# Patient Record
Sex: MALE | Race: OTHER | HISPANIC OR LATINO | URBAN - METROPOLITAN AREA
[De-identification: names, ages, dates, MRNs, and addresses within clinical notes are randomized per-mention and may not be internally consistent; named-entity substitution may affect disease eponyms.]

---

## 2021-01-27 ENCOUNTER — INPATIENT (INPATIENT)
Facility: HOSPITAL | Age: 51
LOS: 6 days | Discharge: HOME | DRG: 554 | End: 2021-02-03
Attending: STUDENT IN AN ORGANIZED HEALTH CARE EDUCATION/TRAINING PROGRAM | Admitting: STUDENT IN AN ORGANIZED HEALTH CARE EDUCATION/TRAINING PROGRAM
Payer: MEDICAID

## 2021-01-27 VITALS
SYSTOLIC BLOOD PRESSURE: 134 MMHG | OXYGEN SATURATION: 100 % | WEIGHT: 225.09 LBS | HEIGHT: 66 IN | TEMPERATURE: 98 F | DIASTOLIC BLOOD PRESSURE: 86 MMHG | HEART RATE: 78 BPM | RESPIRATION RATE: 18 BRPM

## 2021-01-27 PROCEDURE — 99285 EMERGENCY DEPT VISIT HI MDM: CPT

## 2021-01-27 RX ORDER — KETOROLAC TROMETHAMINE 30 MG/ML
15 SYRINGE (ML) INJECTION ONCE
Refills: 0 | Status: DISCONTINUED | OUTPATIENT
Start: 2021-01-27 | End: 2021-01-27

## 2021-01-27 RX ORDER — OXYCODONE AND ACETAMINOPHEN 5; 325 MG/1; MG/1
1 TABLET ORAL ONCE
Refills: 0 | Status: DISCONTINUED | OUTPATIENT
Start: 2021-01-27 | End: 2021-01-27

## 2021-01-27 RX ADMIN — Medication 15 MILLIGRAM(S): at 23:06

## 2021-01-27 RX ADMIN — OXYCODONE AND ACETAMINOPHEN 1 TABLET(S): 5; 325 TABLET ORAL at 23:07

## 2021-01-27 NOTE — ED ADULT TRIAGE NOTE - CHIEF COMPLAINT QUOTE
walked in with c/o rt leg / shoulder and bilateral hip pain  started today getting  worse denies any recent injury . pt states its a chronic pain and  his motrin and naproxen not helping him with the pain

## 2021-01-28 DIAGNOSIS — M25.50 PAIN IN UNSPECIFIED JOINT: ICD-10-CM

## 2021-01-28 DIAGNOSIS — I10 ESSENTIAL (PRIMARY) HYPERTENSION: ICD-10-CM

## 2021-01-28 DIAGNOSIS — N17.9 ACUTE KIDNEY FAILURE, UNSPECIFIED: ICD-10-CM

## 2021-01-28 DIAGNOSIS — M10.9 GOUT, UNSPECIFIED: ICD-10-CM

## 2021-01-28 DIAGNOSIS — Z29.9 ENCOUNTER FOR PROPHYLACTIC MEASURES, UNSPECIFIED: ICD-10-CM

## 2021-01-28 DIAGNOSIS — E11.9 TYPE 2 DIABETES MELLITUS WITHOUT COMPLICATIONS: ICD-10-CM

## 2021-01-28 DIAGNOSIS — Z90.49 ACQUIRED ABSENCE OF OTHER SPECIFIED PARTS OF DIGESTIVE TRACT: Chronic | ICD-10-CM

## 2021-01-28 LAB
A1C WITH ESTIMATED AVERAGE GLUCOSE RESULT: 8 % — HIGH (ref 4–5.6)
ALBUMIN SERPL ELPH-MCNC: 3.9 G/DL — SIGNIFICANT CHANGE UP (ref 3.5–5)
ALP SERPL-CCNC: 120 U/L — SIGNIFICANT CHANGE UP (ref 40–120)
ALT FLD-CCNC: 22 U/L DA — SIGNIFICANT CHANGE UP (ref 10–60)
ANION GAP SERPL CALC-SCNC: 6 MMOL/L — SIGNIFICANT CHANGE UP (ref 5–17)
ANION GAP SERPL CALC-SCNC: 8 MMOL/L — SIGNIFICANT CHANGE UP (ref 5–17)
APTT BLD: 32.7 SEC — SIGNIFICANT CHANGE UP (ref 27.5–35.5)
AST SERPL-CCNC: 10 U/L — SIGNIFICANT CHANGE UP (ref 10–40)
B PERT IGG+IGM PNL SER: ABNORMAL
BASOPHILS # BLD AUTO: 0.08 K/UL — SIGNIFICANT CHANGE UP (ref 0–0.2)
BASOPHILS NFR BLD AUTO: 0.5 % — SIGNIFICANT CHANGE UP (ref 0–2)
BILIRUB SERPL-MCNC: 0.4 MG/DL — SIGNIFICANT CHANGE UP (ref 0.2–1.2)
BUN SERPL-MCNC: 22 MG/DL — HIGH (ref 7–18)
BUN SERPL-MCNC: 24 MG/DL — HIGH (ref 7–18)
CALCIUM SERPL-MCNC: 8.5 MG/DL — SIGNIFICANT CHANGE UP (ref 8.4–10.5)
CALCIUM SERPL-MCNC: 9.2 MG/DL — SIGNIFICANT CHANGE UP (ref 8.4–10.5)
CHLORIDE SERPL-SCNC: 105 MMOL/L — SIGNIFICANT CHANGE UP (ref 96–108)
CHLORIDE SERPL-SCNC: 109 MMOL/L — HIGH (ref 96–108)
CHOLEST SERPL-MCNC: 87 MG/DL — SIGNIFICANT CHANGE UP
CK SERPL-CCNC: 68 U/L — SIGNIFICANT CHANGE UP (ref 35–232)
CO2 SERPL-SCNC: 21 MMOL/L — LOW (ref 22–31)
CO2 SERPL-SCNC: 22 MMOL/L — SIGNIFICANT CHANGE UP (ref 22–31)
COLOR FLD: YELLOW — SIGNIFICANT CHANGE UP
CREAT ?TM UR-MCNC: 151 MG/DL — SIGNIFICANT CHANGE UP
CREAT SERPL-MCNC: 1.21 MG/DL — SIGNIFICANT CHANGE UP (ref 0.5–1.3)
CREAT SERPL-MCNC: 1.53 MG/DL — HIGH (ref 0.5–1.3)
EOSINOPHIL # BLD AUTO: 0.25 K/UL — SIGNIFICANT CHANGE UP (ref 0–0.5)
EOSINOPHIL NFR BLD AUTO: 1.5 % — SIGNIFICANT CHANGE UP (ref 0–6)
ERYTHROCYTE [SEDIMENTATION RATE] IN BLOOD: 23 MM/HR — HIGH (ref 0–20)
ESTIMATED AVERAGE GLUCOSE: 183 MG/DL — HIGH (ref 68–114)
FERRITIN SERPL-MCNC: 208 NG/ML — SIGNIFICANT CHANGE UP (ref 30–400)
FLUID INTAKE SUBSTANCE CLASS: SIGNIFICANT CHANGE UP
FLUID SEGMENTED GRANULOCYTES: 83 % — SIGNIFICANT CHANGE UP
FOLATE SERPL-MCNC: 15 NG/ML — SIGNIFICANT CHANGE UP
GLUCOSE BLDC GLUCOMTR-MCNC: 101 MG/DL — HIGH (ref 70–99)
GLUCOSE BLDC GLUCOMTR-MCNC: 151 MG/DL — HIGH (ref 70–99)
GLUCOSE BLDC GLUCOMTR-MCNC: 162 MG/DL — HIGH (ref 70–99)
GLUCOSE BLDC GLUCOMTR-MCNC: 206 MG/DL — HIGH (ref 70–99)
GLUCOSE SERPL-MCNC: 148 MG/DL — HIGH (ref 70–99)
GLUCOSE SERPL-MCNC: 219 MG/DL — HIGH (ref 70–99)
GRAM STN FLD: SIGNIFICANT CHANGE UP
HCT VFR BLD CALC: 38.7 % — LOW (ref 39–50)
HCT VFR BLD CALC: 41.4 % — SIGNIFICANT CHANGE UP (ref 39–50)
HDLC SERPL-MCNC: 37 MG/DL — LOW
HGB BLD-MCNC: 12.5 G/DL — LOW (ref 13–17)
HGB BLD-MCNC: 13.5 G/DL — SIGNIFICANT CHANGE UP (ref 13–17)
IMM GRANULOCYTES NFR BLD AUTO: 0.4 % — SIGNIFICANT CHANGE UP (ref 0–1.5)
INR BLD: 0.96 RATIO — SIGNIFICANT CHANGE UP (ref 0.88–1.16)
IRON SATN MFR SERPL: 14 % — LOW (ref 20–55)
IRON SATN MFR SERPL: 33 UG/DL — LOW (ref 65–170)
LIPID PNL WITH DIRECT LDL SERPL: 23 MG/DL — SIGNIFICANT CHANGE UP
LYMPHOCYTES # BLD AUTO: 18.4 % — SIGNIFICANT CHANGE UP (ref 13–44)
LYMPHOCYTES # BLD AUTO: 2.97 K/UL — SIGNIFICANT CHANGE UP (ref 1–3.3)
LYMPHOCYTES # FLD: 11 % — SIGNIFICANT CHANGE UP
MAGNESIUM SERPL-MCNC: 1.7 MG/DL — SIGNIFICANT CHANGE UP (ref 1.6–2.6)
MCHC RBC-ENTMCNC: 28.5 PG — SIGNIFICANT CHANGE UP (ref 27–34)
MCHC RBC-ENTMCNC: 28.6 PG — SIGNIFICANT CHANGE UP (ref 27–34)
MCHC RBC-ENTMCNC: 32.3 GM/DL — SIGNIFICANT CHANGE UP (ref 32–36)
MCHC RBC-ENTMCNC: 32.6 GM/DL — SIGNIFICANT CHANGE UP (ref 32–36)
MCV RBC AUTO: 87.7 FL — SIGNIFICANT CHANGE UP (ref 80–100)
MCV RBC AUTO: 88.2 FL — SIGNIFICANT CHANGE UP (ref 80–100)
MONOCYTES # BLD AUTO: 0.97 K/UL — HIGH (ref 0–0.9)
MONOCYTES NFR BLD AUTO: 6 % — SIGNIFICANT CHANGE UP (ref 2–14)
MONOS+MACROS # FLD: 6 % — SIGNIFICANT CHANGE UP
NEUTROPHILS # BLD AUTO: 11.83 K/UL — HIGH (ref 1.8–7.4)
NEUTROPHILS NFR BLD AUTO: 73.2 % — SIGNIFICANT CHANGE UP (ref 43–77)
NON HDL CHOLESTEROL: 50 MG/DL — SIGNIFICANT CHANGE UP
NRBC # BLD: 0 /100 WBCS — SIGNIFICANT CHANGE UP (ref 0–0)
NRBC # BLD: 0 /100 WBCS — SIGNIFICANT CHANGE UP (ref 0–0)
OSMOLALITY UR: 874 MOS/KG — SIGNIFICANT CHANGE UP (ref 50–1200)
PHOSPHATE SERPL-MCNC: 2.2 MG/DL — LOW (ref 2.5–4.5)
PLATELET # BLD AUTO: 225 K/UL — SIGNIFICANT CHANGE UP (ref 150–400)
PLATELET # BLD AUTO: 277 K/UL — SIGNIFICANT CHANGE UP (ref 150–400)
POTASSIUM SERPL-MCNC: 4.5 MMOL/L — SIGNIFICANT CHANGE UP (ref 3.5–5.3)
POTASSIUM SERPL-MCNC: 4.6 MMOL/L — SIGNIFICANT CHANGE UP (ref 3.5–5.3)
POTASSIUM SERPL-SCNC: 4.5 MMOL/L — SIGNIFICANT CHANGE UP (ref 3.5–5.3)
POTASSIUM SERPL-SCNC: 4.6 MMOL/L — SIGNIFICANT CHANGE UP (ref 3.5–5.3)
PROCALCITONIN SERPL-MCNC: 0.07 NG/ML — SIGNIFICANT CHANGE UP (ref 0.02–0.1)
PROT SERPL-MCNC: 7.7 G/DL — SIGNIFICANT CHANGE UP (ref 6–8.3)
PROTHROM AB SERPL-ACNC: 11.4 SEC — SIGNIFICANT CHANGE UP (ref 10.6–13.6)
RBC # BLD: 4.39 M/UL — SIGNIFICANT CHANGE UP (ref 4.2–5.8)
RBC # BLD: 4.72 M/UL — SIGNIFICANT CHANGE UP (ref 4.2–5.8)
RBC # FLD: 12.4 % — SIGNIFICANT CHANGE UP (ref 10.3–14.5)
RBC # FLD: 12.5 % — SIGNIFICANT CHANGE UP (ref 10.3–14.5)
RCV VOL RI: HIGH /UL (ref 0–5)
SARS-COV-2 IGG SERPL QL IA: NEGATIVE — SIGNIFICANT CHANGE UP
SARS-COV-2 IGM SERPL IA-ACNC: 0.07 INDEX — SIGNIFICANT CHANGE UP
SARS-COV-2 RNA SPEC QL NAA+PROBE: SIGNIFICANT CHANGE UP
SODIUM SERPL-SCNC: 134 MMOL/L — LOW (ref 135–145)
SODIUM SERPL-SCNC: 137 MMOL/L — SIGNIFICANT CHANGE UP (ref 135–145)
SODIUM UR-SCNC: 124 MMOL/L — SIGNIFICANT CHANGE UP
SPECIMEN SOURCE: SIGNIFICANT CHANGE UP
TIBC SERPL-MCNC: 243 UG/DL — LOW (ref 250–450)
TOTAL NUCLEATED CELL COUNT, BODY FLUID: 3400 /UL — SIGNIFICANT CHANGE UP
TRIGL SERPL-MCNC: 135 MG/DL — SIGNIFICANT CHANGE UP
TSH SERPL-MCNC: 4.62 UU/ML — SIGNIFICANT CHANGE UP (ref 0.34–4.82)
TUBE TYPE: SIGNIFICANT CHANGE UP
UIBC SERPL-MCNC: 210 UG/DL — SIGNIFICANT CHANGE UP (ref 110–370)
URATE SERPL-MCNC: 4.9 MG/DL — SIGNIFICANT CHANGE UP (ref 3.4–8.8)
URATE UR-MCNC: 54.7 MG/DL — SIGNIFICANT CHANGE UP
VIT B12 SERPL-MCNC: 584 PG/ML — SIGNIFICANT CHANGE UP (ref 232–1245)
WBC # BLD: 16.16 K/UL — HIGH (ref 3.8–10.5)
WBC # BLD: 9.96 K/UL — SIGNIFICANT CHANGE UP (ref 3.8–10.5)
WBC # FLD AUTO: 16.16 K/UL — HIGH (ref 3.8–10.5)
WBC # FLD AUTO: 9.96 K/UL — SIGNIFICANT CHANGE UP (ref 3.8–10.5)

## 2021-01-28 PROCEDURE — 76775 US EXAM ABDO BACK WALL LIM: CPT | Mod: 26

## 2021-01-28 PROCEDURE — 73562 X-RAY EXAM OF KNEE 3: CPT | Mod: 26,RT

## 2021-01-28 PROCEDURE — 99221 1ST HOSP IP/OBS SF/LOW 40: CPT

## 2021-01-28 PROCEDURE — 99222 1ST HOSP IP/OBS MODERATE 55: CPT

## 2021-01-28 RX ORDER — TRAMADOL HYDROCHLORIDE 50 MG/1
50 TABLET ORAL EVERY 8 HOURS
Refills: 0 | Status: DISCONTINUED | OUTPATIENT
Start: 2021-01-28 | End: 2021-01-28

## 2021-01-28 RX ORDER — SODIUM CHLORIDE 9 MG/ML
1000 INJECTION INTRAMUSCULAR; INTRAVENOUS; SUBCUTANEOUS
Refills: 0 | Status: DISCONTINUED | OUTPATIENT
Start: 2021-01-28 | End: 2021-01-30

## 2021-01-28 RX ORDER — SODIUM CHLORIDE 9 MG/ML
1000 INJECTION INTRAMUSCULAR; INTRAVENOUS; SUBCUTANEOUS ONCE
Refills: 0 | Status: COMPLETED | OUTPATIENT
Start: 2021-01-28 | End: 2021-01-28

## 2021-01-28 RX ORDER — PANTOPRAZOLE SODIUM 20 MG/1
40 TABLET, DELAYED RELEASE ORAL
Refills: 0 | Status: DISCONTINUED | OUTPATIENT
Start: 2021-01-28 | End: 2021-02-03

## 2021-01-28 RX ORDER — HEPARIN SODIUM 5000 [USP'U]/ML
5000 INJECTION INTRAVENOUS; SUBCUTANEOUS EVERY 8 HOURS
Refills: 0 | Status: DISCONTINUED | OUTPATIENT
Start: 2021-01-28 | End: 2021-01-28

## 2021-01-28 RX ORDER — OXYCODONE AND ACETAMINOPHEN 5; 325 MG/1; MG/1
1 TABLET ORAL ONCE
Refills: 0 | Status: DISCONTINUED | OUTPATIENT
Start: 2021-01-28 | End: 2021-01-28

## 2021-01-28 RX ORDER — LIDOCAINE 4 G/100G
3 CREAM TOPICAL DAILY
Refills: 0 | Status: DISCONTINUED | OUTPATIENT
Start: 2021-01-28 | End: 2021-02-03

## 2021-01-28 RX ORDER — ALLOPURINOL 300 MG
1 TABLET ORAL
Qty: 0 | Refills: 0 | DISCHARGE

## 2021-01-28 RX ORDER — COLCHICINE 0.6 MG
0.6 TABLET ORAL
Refills: 0 | Status: COMPLETED | OUTPATIENT
Start: 2021-01-28 | End: 2021-02-01

## 2021-01-28 RX ORDER — METFORMIN HYDROCHLORIDE 850 MG/1
1 TABLET ORAL
Qty: 0 | Refills: 0 | DISCHARGE

## 2021-01-28 RX ORDER — INSULIN LISPRO 100/ML
VIAL (ML) SUBCUTANEOUS
Refills: 0 | Status: DISCONTINUED | OUTPATIENT
Start: 2021-01-28 | End: 2021-02-03

## 2021-01-28 RX ORDER — SODIUM CHLORIDE 9 MG/ML
3 INJECTION INTRAMUSCULAR; INTRAVENOUS; SUBCUTANEOUS EVERY 8 HOURS
Refills: 0 | Status: DISCONTINUED | OUTPATIENT
Start: 2021-01-28 | End: 2021-02-03

## 2021-01-28 RX ORDER — OXYCODONE HYDROCHLORIDE 5 MG/1
2.5 TABLET ORAL ONCE
Refills: 0 | Status: DISCONTINUED | OUTPATIENT
Start: 2021-01-28 | End: 2021-01-29

## 2021-01-28 RX ORDER — ACETAMINOPHEN 500 MG
650 TABLET ORAL EVERY 4 HOURS
Refills: 0 | Status: DISCONTINUED | OUTPATIENT
Start: 2021-01-28 | End: 2021-01-28

## 2021-01-28 RX ORDER — ACETAMINOPHEN 500 MG
1000 TABLET ORAL EVERY 8 HOURS
Refills: 0 | Status: DISCONTINUED | OUTPATIENT
Start: 2021-01-28 | End: 2021-02-01

## 2021-01-28 RX ORDER — ALLOPURINOL 300 MG
100 TABLET ORAL DAILY
Refills: 0 | Status: DISCONTINUED | OUTPATIENT
Start: 2021-01-28 | End: 2021-02-03

## 2021-01-28 RX ORDER — ATORVASTATIN CALCIUM 80 MG/1
1 TABLET, FILM COATED ORAL
Qty: 0 | Refills: 0 | DISCHARGE

## 2021-01-28 RX ORDER — LIDOCAINE 4 G/100G
1 CREAM TOPICAL DAILY
Refills: 0 | Status: DISCONTINUED | OUTPATIENT
Start: 2021-01-28 | End: 2021-01-28

## 2021-01-28 RX ORDER — INDOMETHACIN 50 MG
25 CAPSULE ORAL EVERY 8 HOURS
Refills: 0 | Status: COMPLETED | OUTPATIENT
Start: 2021-01-28 | End: 2021-01-29

## 2021-01-28 RX ORDER — ATORVASTATIN CALCIUM 80 MG/1
40 TABLET, FILM COATED ORAL AT BEDTIME
Refills: 0 | Status: DISCONTINUED | OUTPATIENT
Start: 2021-01-28 | End: 2021-01-31

## 2021-01-28 RX ORDER — OMEPRAZOLE 10 MG/1
1 CAPSULE, DELAYED RELEASE ORAL
Qty: 0 | Refills: 0 | DISCHARGE

## 2021-01-28 RX ORDER — LISINOPRIL 2.5 MG/1
1 TABLET ORAL
Qty: 0 | Refills: 0 | DISCHARGE

## 2021-01-28 RX ADMIN — LIDOCAINE 1 PATCH: 4 CREAM TOPICAL at 14:25

## 2021-01-28 RX ADMIN — SODIUM CHLORIDE 1000 MILLILITER(S): 9 INJECTION INTRAMUSCULAR; INTRAVENOUS; SUBCUTANEOUS at 03:25

## 2021-01-28 RX ADMIN — SODIUM CHLORIDE 3 MILLILITER(S): 9 INJECTION INTRAMUSCULAR; INTRAVENOUS; SUBCUTANEOUS at 22:36

## 2021-01-28 RX ADMIN — ATORVASTATIN CALCIUM 40 MILLIGRAM(S): 80 TABLET, FILM COATED ORAL at 22:35

## 2021-01-28 RX ADMIN — Medication 0.6 MILLIGRAM(S): at 18:20

## 2021-01-28 RX ADMIN — SODIUM CHLORIDE 100 MILLILITER(S): 9 INJECTION INTRAMUSCULAR; INTRAVENOUS; SUBCUTANEOUS at 06:27

## 2021-01-28 RX ADMIN — HEPARIN SODIUM 5000 UNIT(S): 5000 INJECTION INTRAVENOUS; SUBCUTANEOUS at 15:24

## 2021-01-28 RX ADMIN — SODIUM CHLORIDE 1000 MILLILITER(S): 9 INJECTION INTRAMUSCULAR; INTRAVENOUS; SUBCUTANEOUS at 04:00

## 2021-01-28 RX ADMIN — Medication 1: at 23:02

## 2021-01-28 RX ADMIN — Medication 25 MILLIGRAM(S): at 22:34

## 2021-01-28 RX ADMIN — SODIUM CHLORIDE 3 MILLILITER(S): 9 INJECTION INTRAMUSCULAR; INTRAVENOUS; SUBCUTANEOUS at 14:51

## 2021-01-28 RX ADMIN — Medication 0.6 MILLIGRAM(S): at 06:27

## 2021-01-28 RX ADMIN — PANTOPRAZOLE SODIUM 40 MILLIGRAM(S): 20 TABLET, DELAYED RELEASE ORAL at 07:40

## 2021-01-28 RX ADMIN — SODIUM CHLORIDE 3 MILLILITER(S): 9 INJECTION INTRAMUSCULAR; INTRAVENOUS; SUBCUTANEOUS at 07:17

## 2021-01-28 RX ADMIN — HEPARIN SODIUM 5000 UNIT(S): 5000 INJECTION INTRAVENOUS; SUBCUTANEOUS at 06:26

## 2021-01-28 RX ADMIN — Medication 100 MILLIGRAM(S): at 14:25

## 2021-01-28 RX ADMIN — Medication 25 MILLIGRAM(S): at 18:19

## 2021-01-28 RX ADMIN — LIDOCAINE 3 PATCH: 4 CREAM TOPICAL at 18:21

## 2021-01-28 RX ADMIN — Medication 650 MILLIGRAM(S): at 06:26

## 2021-01-28 RX ADMIN — TRAMADOL HYDROCHLORIDE 50 MILLIGRAM(S): 50 TABLET ORAL at 06:38

## 2021-01-28 RX ADMIN — Medication 1: at 14:24

## 2021-01-28 NOTE — CONSULT NOTE ADULT - SUBJECTIVE AND OBJECTIVE BOX
[facility]OLF [facility]  [id number]    ORTHOPEDIC CONSULT: Right knee effusion     Orthopedic diagnosis: Right knee effusion    HPI:  49 y/o M, independent ambulator, with a PMHx of DM, HTN, Gout (last attack was 7 years ago in b/l ankles) and a PSHx significant for right knee arthroscopy x 2 (1990's?) who presents today c/o right knee pain x 3 weeks. Patient states the pain started in his right knee, which progressively worsened yesterday where he was having difficulty with ambulation and decided to come into the ED. Patient denies any recent falls or trauma. Patient reports seeing an orthopedist as an outpatient and was recommended to do an MRI of the right knee. which has not been performed yet, per patient. He states pain is localized to right knee, "8/10" in severity, non-radiating, worsened with extension and movement of the right knee. Pt denies Chest pain, SOB, dyspnea, paresthesias, N/V/D, fevers, chills, urinary frequency, dysuria, cough.    Denies chills, night sweats, CP/SOB, paresthesias, or fevers.     Drug Allergies Not Recorded  pollen, seasonal allergies (Sneezing)      PAST MEDICAL & SURGICAL HISTORY:  HTN (hypertension)  DM (diabetes mellitus)  Gout  S/P cholecystectomy  S/P appendectomy        Vital Signs Last 24 Hrs  T(C): 36.6 (28 Jan 2021 14:20), Max: 36.9 (27 Jan 2021 23:58)  T(F): 97.8 (28 Jan 2021 14:20), Max: 98.5 (27 Jan 2021 23:58)  HR: 58 (28 Jan 2021 14:20) (58 - 78)  BP: 146/91 (28 Jan 2021 14:20) (125/78 - 146/91)  BP(mean): --  RR: 16 (28 Jan 2021 14:20) (16 - 18)  SpO2: 99% (28 Jan 2021 14:20) (99% - 100%)    PHYSICAL EXAM:    Right Knee: Skin intact. No Erythema.   Knee is stable with no valgus/varus instability.   Knee is flexed to 60 degrees, unable to fully extend knee 2/2 pain   Effusion effusion noted. TTP to right knee joint line and suprapatellar region   Pain patellofemoral compression.  NVI.   No calf tenderness b/l, calves are soft, SILT. +brisk, 2+ pedal pulses.   5/5 strength of the EHL/TA/GS b/l.     RADIOLOGY:  EXAM:  KNEE AP.LAT&OBL.RIGHT                        PROCEDURE DATE:  01/28/2021      INTERPRETATION:  Right knee. 3 views. Patient has local pain without antecedent trauma.    There is no definite effusion. There are mild degenerative changes. No fracture.    IMPRESSION: Mild degeneration.      IMPRESSION: Pt is 50y with Right Knee Effusion     PLAN:   -  Pain management  -  Patient was identified, site of procedure was marked and time-out performed   -  Right knee arthrocentesis at bedside was recommended and consent was obtained from the patient.   -  The affected knee was sterilely prepped with chlorhexidine x 2 and an  arthrocentesis was performed with the usual sterile technique. The knee was aspirated with a 60cc syringe and 18gauge needle through the LATERAL aspect of the Right knee.  15mL of yellow, synovial fluid,  was obtained.  The knee was sterilely wrapped with 4x4 gauze. Procedure was performed with no acute events/complications. Pt tolerated procedure very well.  Pt is stable.   -  Cell count, Crystals, Gram stain, culture and sensitivity was ordered for the synovial fluid of the affected knee  -  F/u in AM -> check cell count, etc.  -  DVT ppx with venodynes recommended  -  All the patients questions were answered.    -  Case d/w Dr. Man   
  Source of information: ROLF QUINTANA, Chart review  Patient language: English  : n/a    HPI:  51 y/o male h/o gout, DM, HTN and psg hx of right knee sxg, appendectomy and cholecystectomy  presenting with acute on chronic pain in multiple joints. Pt states his pain started in R knee 3 weeks ago and now he has bl hip and shoulder pain. Pt was unable to ambulate today due to pain and decided to come to ED. Pt states his last gout flare was years ago. Pt denies and recent trauma or falls. Pt mentioned working on floor couple weeks ago and that could've started pain. Pt consumes pork and red meat once a month and rarely drinks alcohol.  (2021 04:56)      Patient is a 50y old  Male who presents with a chief complaint of joint pain. Pain consulted for right knee pain. Pt seen and examined at bedside. Pt laying in bed, reports generalized joint pain began 3 weeks ago and worsened over the past several days which led him to come to the ED. Pt has hx gout, last attack 7 years ago. Reports recent weight loss of 25lbs since August due to diet change. Reports increasing his vegetable intake, which he believes brought about his gout flare. Reports pain is worst over his right knee, rates pain score 8/10 and not tolerable. Pt describes pain as "pulling" intermittent, radiating to right leg, not alleviated by Tylenol, exacerbated by movement and standing extended time. PT reports b/l shoulder and hip pain is 5/10 and tolerable at this time.  Pts uric acid 4.9. States he was taking naproxen and ibuprofen 600mg PO at home and a dose of tizandine 2mg which he received from his daughter. Pt tolerating PO diet. Denies lethargy, nausea, vomiting, constipation, itchiness. Reports having multiple episodes of diarrhea, last BM . Pt states he was experiencing diarrhea 4-5x/day. Pt reports having difficulty sleeping due to pain. Patient stated goal for pain control: to be able to take deep breaths, get out of bed to chair and ambulate with tolerable pain control. Pending CT right knee and ortho consult.     PAST MEDICAL & SURGICAL HISTORY:  HTN (hypertension)    DM (diabetes mellitus)    Gout    S/P cholecystectomy    S/P appendectomy        FAMILY HISTORY:  Mother- DM  Father- , hx prostate cancer         Social History:  Alcohol: occasional use   Smoking: Denied  Illicit drugs: Denied (2021 04:56)    Allergies    No Known Allergies      MEDICATIONS  (STANDING):  allopurinol 100 milliGRAM(s) Oral daily  atorvastatin 40 milliGRAM(s) Oral at bedtime  colchicine 0.6 milliGRAM(s) Oral two times a day  heparin   Injectable 5000 Unit(s) SubCutaneous every 8 hours  indomethacin 25 milliGRAM(s) Oral every 8 hours  insulin lispro (ADMELOG) corrective regimen sliding scale   SubCutaneous Before meals and at bedtime  lidocaine   Patch 3 Patch Transdermal daily  pantoprazole    Tablet 40 milliGRAM(s) Oral before breakfast  sodium chloride 0.9% lock flush 3 milliLiter(s) IV Push every 8 hours  sodium chloride 0.9%. 1000 milliLiter(s) (100 mL/Hr) IV Continuous <Continuous>    MEDICATIONS  (PRN):  acetaminophen   Tablet .. 650 milliGRAM(s) Oral every 4 hours PRN Mild Pain (1 - 3)  traMADol 50 milliGRAM(s) Oral every 8 hours PRN Severe Pain (7 - 10)      Vital Signs Last 24 Hrs  T(C): 36.6 (2021 14:20), Max: 36.9 (2021 23:58)  T(F): 97.8 (2021 14:20), Max: 98.5 (2021 23:58)  HR: 58 (2021 14:20) (58 - 78)  BP: 146/91 (2021 14:20) (125/78 - 146/91)  BP(mean): --  RR: 16 (2021 14:20) (16 - 18)  SpO2: 99% (2021 14:20) (99% - 100%)    LABS: Reviewed.                          12.5   9.96  )-----------( 225      ( 2021 12:38 )             38.7         134<L>  |  105  |  22<H>  ----------------------------<  219<H>  4.5   |  21<L>  |  1.21    Ca    8.5      2021 12:38  Phos  2.2       Mg     1.7         TPro  7.7  /  Alb  3.9  /  TBili  0.4  /  DBili  x   /  AST  10  /  ALT  22  /  AlkPhos  120      PT/INR - ( 2021 03:27 )   PT: 11.4 sec;   INR: 0.96 ratio         PTT - ( 2021 03:27 )  PTT:32.7 sec  LIVER FUNCTIONS - ( 2021 03:27 )  Alb: 3.9 g/dL / Pro: 7.7 g/dL / ALK PHOS: 120 U/L / ALT: 22 U/L DA / AST: 10 U/L / GGT: x             CAPILLARY BLOOD GLUCOSE      POCT Blood Glucose.: 162 mg/dL (2021 14:04)  POCT Blood Glucose.: 206 mg/dL (2021 11:42)  POCT Blood Glucose.: 214 mg/dL (2021 23:45)    COVID-19 PCR: NotDetec (2021 05:07)      Radiology: Reviewed.   < from: US Renal (21 @ 11:07) >    EXAM:  US KIDNEY(S)                            PROCEDURE DATE:  2021          INTERPRETATION:  CLINICAL INFORMATION: Acute renal insufficiency    COMPARISON: None available.    TECHNIQUE: Sonography of the kidneys and bladder.    FINDINGS:    Right kidney: 10.6 cm. No renal mass, hydronephrosis or calculi.    Left kidney: 11.3 cm. No renal mass, hydronephrosis or calculi.    Urinary bladder: Within normal limits.    IMPRESSION:    Normal renal ultrasound.                  VARUN MORA MD; Attending Radiologist  This document has been electronically signed. 2021  1:06PM    < end of copied text >    < from: Xray Knee 3 Views, Right (21 @ 00:49) >    EXAM:  KNEE AP.LAT&OBL.RIGHT                            PROCEDURE DATE:  2021          INTERPRETATION:  Right knee. 3 views. Patient has local pain without antecedent trauma.    There is no definite effusion. There are mild degenerative changes. No fracture.    IMPRESSION: Mild degeneration.            NAJMA BERRIOS M.D., ATTENDING RADIOLOGIST  This document has been electronically signed. 2021 10:58AM    < end of copied text >      ORT Score -   Family Hx of substance abuse	Female	      Male  Alcohol 	                                           1                     3  Illegal drugs	                                   2                     3  Rx drugs                                           4 	                  4  Personal Hx of substance abuse		  Alcohol 	                                          3	                  3  Illegal drugs                                     4	                  4  Rx drugs                                            5 	                  5  Age between 16- 45 years	           1                     1  hx preadolescent sexual abuse	   3 	                  0  Psychological disease		  ADD, OCD, bipolar, schizophrenia   2	          2  Depression                                           1 	          1  Total: 0    a score of 3 or lower indicates low risk for opioid abuse		  a score of 4-7 indicates moderate risk for opioid abuse		  a score of 8 or higher indicates high risk for opioid abuse    REVIEW OF SYSTEMS:  CONSTITUTIONAL: No fever or fatigue  RESPIRATORY: No cough, wheezing, chills or hemoptysis; No shortness of breath  CARDIOVASCULAR: No chest pain, palpitations, dizziness, or leg swelling  GASTROINTESTINAL: No abdominal or epigastric pain. No nausea, vomiting; No constipation. + diarrhea    GENITOURINARY: No dysuria, frequency, hematuria, retention or incontinence  MUSCULOSKELETAL: + right knee joint pain and swelling; no upper or lower motor strength weakness, no saddle anesthesia, bowel/bladder incontinence  NEURO: No numbness/ tingling in b/l LE   PSYCHIATRIC: No depression, anxiety, mood swings, + difficulty sleeping due to pain    PHYSICAL EXAM:  GENERAL:  Alert & Oriented X3, NAD, Good concentration  CHEST/LUNG: Clear to auscultation bilaterally; No rales, rhonchi, wheezing, or rubs  HEART: Regular rate and rhythm; No murmurs, rubs, or gallops  ABDOMEN: Soft, Nontender, Nondistended; Bowel sounds present  EXTREMITIES:  2+ Peripheral Pulses, No cyanosis   MUSCULOSKELETAL: + generalized joint tenderness (b/l shoulders, hips and knees), greatest tenderness over right knee. + right knee moderate edema and warmth; Motor Strength 5/5 B/L upper and lower extremities; ROM decreased in bilateral shoulders and knees due to pain   SKIN: No rashes or lesions +lidoderm patch over right knee     Risk factors associated with adverse outcomes related to opioid treatment  [ ]  Concurrent benzodiazepine use  [ ]  History/ Active substance use or alcohol use disorder  [ ] Psychiatric co-morbidity  [ ] Sleep apnea  [ ] COPD  [X] BMI> 35  [ ] Liver dysfunction  [X ] Renal dysfunction  [ ] CHF  [ ] Smoker  [ ]  Age > 60 years    [X ]  [place]  Reviewed and Copied to Chart. See below.    Plan of care and goal oriented pain management treatment options were discussed with patient and /or primary care giver; all questions and concerns were addressed and care was aligned with patient's wishes.    Educated patient on goal oriented pain management treatment options

## 2021-01-28 NOTE — H&P ADULT - ASSESSMENT
49 y/o male h/o gout, DM, HTN and psg hx of right knee sxg, appendectomy and cholecystectomy  presenting with acute on chronic pain in multiple joints

## 2021-01-28 NOTE — CONSULT NOTE ADULT - PROBLEM SELECTOR RECOMMENDATION 9
Pt with generalized joint pain greatest over right knee somatic in nature due to probable gout flare vs septic joint. Ortho consulted.  Opioid pain recommendations   - Continue Tramadol 50mg PO q 8 hours PRN severe pain x 1 day Monitor for sedation/ respiratory depression.   Non-opioid pain recommendations   - Tylenol 1G PO q8h PRN moderate pain.   - Indomethacin 25mg PO q8h x 24hrs only. Will reevaluate tomorrow. Cr 1.21.   - Lidoderm 5% patch 3 patches daily.   Bowel Regimen  -Per primary team, pt with diarrhea 1/27  Mild pain   - Non-pharmacological pain treatment recommendations  - Warm/ Cool packs PRN   - Repositioning, elevation, imagery, relaxation, distraction.  - Physical therapy OOB if no contraindications   Recommendations discussed with primary team and RN Pt with generalized joint pain greatest over right knee somatic in nature due to probable gout flare vs septic joint. Ortho consulted.  Opioid pain recommendations   - Continue Tramadol 50mg PO q 8 hours PRN severe pain x 1 day Monitor for sedation/ respiratory depression.   Non-opioid pain recommendations   - Tylenol 1G PO q8h PRN moderate pain.   - Indomethacin 25mg PO q8h x 24hrs only. Will reevaluate tomorrow. Cr 1.21.   - Lidoderm 5% patch 3 patches daily.   - Continue allopurinol 100mg PO daily.  - Continue colchicine 0.6 PO 2x/day.   Bowel Regimen  -Per primary team, pt with diarrhea 1/27  Mild pain   - Non-pharmacological pain treatment recommendations  - Warm/ Cool packs PRN   - Repositioning, elevation, imagery, relaxation, distraction.  - Physical therapy OOB if no contraindications   Nutrition consult  Recommendations discussed with primary team and RN

## 2021-01-28 NOTE — H&P ADULT - NSHPPHYSICALEXAM_GEN_ALL_CORE
ICU Vital Signs Last 24 Hrs  T(C): 36.9 (27 Jan 2021 23:58), Max: 36.9 (27 Jan 2021 23:58)  T(F): 98.5 (27 Jan 2021 23:58), Max: 98.5 (27 Jan 2021 23:58)  HR: 69 (27 Jan 2021 23:58) (69 - 78)  BP: 127/71 (27 Jan 2021 23:58) (127/71 - 134/86)  BP(mean): --  ABP: --  ABP(mean): --  RR: 18 (27 Jan 2021 22:04) (18 - 18)  SpO2: 99% (27 Jan 2021 23:58) (99% - 100%)      GENERAL: NAD, lying in bed comfortably  HEAD:  Atraumatic, Normocephalic  EYES: EOMI, PERRLA, conjunctiva and sclera clear  ENT: Moist mucous membranes  NECK: Supple, No JVD  CHEST/LUNG: Clear to auscultation bilaterally; No rales, rhonchi, wheezing, or rubs.  HEART: Regular rate and rhythm; S1+ S2+  ABDOMEN: Bowel sounds present; Soft, Nontender, Nondistended.   EXTREMITIES:  2+ Peripheral Pulses, brisk capillary refill. No clubbing, cyanosis, or edema  NERVOUS SYSTEM:  Alert & Oriented X3, speech clear. No deficits   MSK: LROM in RLE at knee. Tender to palpate, no effusion appreciated, no warmth or erythema. LLE ok to move. No dip or pip joint tenderness in LE. R 2nd finger had pain and erythema at dip joint. LUE joints are wnl. Pt also noted to have limited ROM in shoulders, R>L   SKIN: No rashes or lesions

## 2021-01-28 NOTE — H&P ADULT - HISTORY OF PRESENT ILLNESS
51 y/o male h/o gout, DM, HTN, presenting with acute on chronic pain in multiple joints worsening over 1 day.  51 y/o male h/o gout, DM, HTN and psg hx of right knee sxg, appendectomy and cholecystectomy  presenting with acute on chronic pain in multiple joints. Pt states his pain started in R knee 3 weeks ago and now he has bl hip and shoulder pain. Pt was unable to ambulate today due to pain and decided to come to ED. Pt states his last gout flare was years ago. Pt denies and recent trauma or falls. Pt mentioned working on floor couple weeks ago and that could've started pain. Pt consumes pork and red meat once a month and rarely drinks alcohol.

## 2021-01-28 NOTE — ED PROVIDER NOTE - CLINICAL SUMMARY MEDICAL DECISION MAKING FREE TEXT BOX
Pt presenting with worsening joint pains that pt states is in same joints as chronic pain and no other associated symptoms. Will xray R knee and give pain control. Pt stable. Will reassess. Pt presenting with worsening joint pains that pt states is in same joints as chronic pain and no other associated symptoms. Will xray R knee and give pain control. Pt stable. Will reassess.    XR showing likely OA. S/p meds in the ED pt states that he is still having trouble ambulating and doesn't think he'll be able to take care of himself at home and requesting admission for inability to ambulate. Labs showing WBC elevation and mild elevation ESR and Cr. Pt given second dose percocet and endorsed to inpatient team. Pt stable.

## 2021-01-28 NOTE — ED PROVIDER NOTE - PHYSICAL EXAMINATION
Vital Signs Reviewed  GEN: Comfortable, NAD, AAOx3  HEENT: NCAT, EOMI, MMM, Neck Supple  RESP: CTAB, No rales/rhonchi/wheezing  CV: RRR, S1S2, No murmurs  ABD: No TTP, ND, No masses, No CVA Tenderness  Extrem/Skin: R knee mild effusion without warmth or redness and able to bear weight, Antalgic gait, Equal pulses bilat, No skin changes noted, No cyanosis/rashes  Neuro: No focal deficits

## 2021-01-28 NOTE — CONSULT NOTE ADULT - ASSESSMENT
Confidential Drug Utilization Report  Search Terms: [place], 1970   Search Date: 01/28/2021 14:55:30 PM   The Drug Utilization Report below displays all of the controlled substance prescriptions, if any, that your patient has filled in the last twelve months. The information displayed on this report is compiled from pharmacy submissions to the Department, and accurately reflects the information as submitted by the pharmacies.  This report was requested by: India Vicente | Reference #: [id number]   There are no results for the search terms that you entered.

## 2021-01-28 NOTE — CONSULT NOTE ADULT - ATTENDING COMMENTS
Case reviewed.  Agree with note and plan.  f/u aspirate lab results.  Will see pt in the morning. Case reviewed.  Agree with note and plan.  f/u aspirate lab results: No evidence of knee infection. No surgical intervention.  Suggest to use antiinflammatories.  Will see pt in the morning. 1/28/21 By Dr. Man  Case reviewed.  Agree with note and plan.  f/u aspirate lab results: No evidence of knee infection. No surgical intervention.  Suggest to use antiinflammatories.  Will see pt in the morning.    1/29/21 By Dr. Man  CPPD crystals found in knee aspirate, indicating pesudogout.  No indication for surgery.  medical treatment per primary team.

## 2021-01-28 NOTE — H&P ADULT - PROBLEM SELECTOR PLAN 5
pt with hx of gout  continue with home medications  started on colchicine .6 bid for now  cw tylenol and lidocaine patches   since joint pain is systemic we can consider trial of systemic steroids   Follow up UA level,; however in acute episode low uric acid levels are to be expected   plan as above

## 2021-01-28 NOTE — H&P ADULT - NSHPREVIEWOFSYSTEMS_GEN_ALL_CORE
Denies nausea, vomiting, SOB, palpitations, dizziness, headache, cough, wheezing, fever, chills. Close Supervision progressing to Modified Independent

## 2021-01-28 NOTE — H&P ADULT - PROBLEM SELECTOR PLAN 1
Pt presented with acute on chronic multiple joint pain  ddx inflammatory vs non inflammatory arthritis   Xray of right knee does not show any fx; fu official read   very low suspicion for septic arthritis at this time   Wbc noted to be elevated, pt is afebrile  Pt was  given percocet in ED, with no improvement   will start on colchicine for possible gout flare  will hold off on abx   cw tylenol and lidocaine patches  pt is having difficulty ambulating; Follow up PT for recs

## 2021-01-28 NOTE — H&P ADULT - ATTENDING COMMENTS
Pt seen and examined.  Case discussed with MAR    50 year old man with PMH DM2, HTN, gouty arthritis here with acute onset severe pain in multiple upper and lower extremity joints but most severely in the right knee X 1 day. He has history of monoarticular gouty arthritis that is usually limited to the ankle joints. This pain started yesterday evening and has persisted. There was no fever or additional symptoms on ROS.  He was treated in the ED but remained unable to ambulate. Admitted for pain control.    Vital Signs Last 24 Hrs  T(C): 36.9 (27 Jan 2021 23:58), Max: 36.9 (27 Jan 2021 23:58)  T(F): 98.5 (27 Jan 2021 23:58), Max: 98.5 (27 Jan 2021 23:58)  HR: 69 (27 Jan 2021 23:58) (69 - 78)  BP: 127/71 (27 Jan 2021 23:58) (127/71 - 134/86)  RR: 18 (27 Jan 2021 22:04) (18 - 18)  SpO2: 99% (27 Jan 2021 23:58) (99% - 100%)    Young man, obese, in painful distress, AAO X 3  CTA B/L RRR S1S2 only  Full NTND BS +  Tenderness to palpation with limitation to range of motion from pain in right shoulder, B/L hip and right knee joint. There is subjective warmth but no erythema.  No pedal edema  No focal deficits    Labs                        13.5   16.16 )-----------( 277      ( 28 Jan 2021 03:27 )             41.4     01-28    137  |  109<H>  |  24<H>  ----------------------------<  148<H>  4.6   |  22  |  1.53<H>    Ca    9.2      28 Jan 2021 03:27  TPro  7.7  /  Alb  3.9  /  TBili  0.4  /  DBili  x   /  AST  10  /  ALT  22  /  AlkPhos  120  01-28    Right x- ray   unremarkable    Impression  - Acute polyarticular arthralgia   r/o gouty arthritis   Unlikely to be septic arthritis  Admit to pain control   Prednisone 40 mg PO daily  Colchicine   Avoid NSAIDs with poor renal function.    - DM 2  resume home meds except metformin  Check A1c    - Renal insufficiency  IVF challenge  Likely CKD related to DM  Urine lytes; calc FeNa  Check renal U/S    - HTN   resume home meds

## 2021-01-28 NOTE — CHART NOTE - NSCHARTNOTEFT_GEN_A_CORE
EVENT: patient seen and examined     OBJECTIVE:  Vital Signs Last 24 Hrs  T(C): 36.6 (28 Jan 2021 07:51), Max: 36.9 (27 Jan 2021 23:58)  T(F): 97.8 (28 Jan 2021 07:51), Max: 98.5 (27 Jan 2021 23:58)  HR: 61 (28 Jan 2021 07:51) (61 - 78)  BP: 125/78 (28 Jan 2021 07:51) (125/78 - 134/86)  BP(mean): --  RR: 16 (28 Jan 2021 07:51) (16 - 18)  SpO2: 100% (28 Jan 2021 07:51) (99% - 100%)    ROS: c/o generalized joint pain in b/l shoulders, knees and hips     PHYSICAL EXAM:  GENERAL: NAD, well-developed   EYES: EOMI, PERRLA, conjunctiva and sclera clear  NECK: Supple, No JVD  CHEST/LUNG: Clear to auscultation bilaterally; No wheeze  HEART: Regular rate and rhythm; No murmurs, rubs, or gallops  ABDOMEN: Soft, Nontender, Nondistended; Bowel sounds present  EXTREMITIES:  2+ Peripheral Pulses, No clubbing, cyanosis, or edema  PSYCH: AAOx3  NEUROLOGY: non-focal  MSK: right knee edema +1, limited ROM hips and right knee due to pain     LABS:                        13.5   16.16 )-----------( 277      ( 28 Jan 2021 03:27 )             41.4     01-28    137  |  109<H>  |  24<H>  ----------------------------<  148<H>  4.6   |  22  |  1.53<H>    Ca    9.2      28 Jan 2021 03:27    TPro  7.7  /  Alb  3.9  /  TBili  0.4  /  DBili  x   /  AST  10  /  ALT  22  /  AlkPhos  120  01-28    IMGAGING:    < from: Xray Knee 3 Views, Right (01.28.21 @ 00:49) >      EXAM:  KNEE AP.LAT&OBL.RIGHT                            PROCEDURE DATE:  01/28/2021          INTERPRETATION:  Right knee. 3 views. Patient has local pain without antecedent trauma.    There is no definite effusion. There are mild degenerative changes. No fracture.    IMPRESSION: Mild degeneration.      A/P: 49 y/o male h/o gout, DM, HTN and psg hx of right knee sxg, appendectomy and cholecystectomy  presenting with acute on chronic pain in multiple joints.    Polyarticular arthralgia: likely due to gouty arthritis, less likely septic arthritis, cont pain control, colchicine, PT  consult,  f/u CT of right knee  RUTH: RUTH vs CKD likely due to DM. f/u renal US, avoid nephrotoxins   DM: cont HSSC   DVT ppx: cont Heparin EVENT: patient seen and examined     OBJECTIVE:  Vital Signs Last 24 Hrs  T(C): 36.6 (28 Jan 2021 07:51), Max: 36.9 (27 Jan 2021 23:58)  T(F): 97.8 (28 Jan 2021 07:51), Max: 98.5 (27 Jan 2021 23:58)  HR: 61 (28 Jan 2021 07:51) (61 - 78)  BP: 125/78 (28 Jan 2021 07:51) (125/78 - 134/86)  BP(mean): --  RR: 16 (28 Jan 2021 07:51) (16 - 18)  SpO2: 100% (28 Jan 2021 07:51) (99% - 100%)    ROS: c/o generalized joint pain in b/l shoulders, knees and hips     PHYSICAL EXAM:  GENERAL: NAD, well-developed   EYES: EOMI, PERRLA, conjunctiva and sclera clear  NECK: Supple, No JVD  CHEST/LUNG: Clear to auscultation bilaterally; No wheeze  HEART: Regular rate and rhythm; No murmurs, rubs, or gallops  ABDOMEN: Soft, Nontender, Nondistended; Bowel sounds present  EXTREMITIES:  2+ Peripheral Pulses, No clubbing, cyanosis, or edema  PSYCH: AAOx3  NEUROLOGY: non-focal  MSK: right knee edema +1, limited ROM hips and right knee due to pain     LABS:                        13.5   16.16 )-----------( 277      ( 28 Jan 2021 03:27 )             41.4     01-28    137  |  109<H>  |  24<H>  ----------------------------<  148<H>  4.6   |  22  |  1.53<H>    Ca    9.2      28 Jan 2021 03:27    TPro  7.7  /  Alb  3.9  /  TBili  0.4  /  DBili  x   /  AST  10  /  ALT  22  /  AlkPhos  120  01-28    IMGAGING:    < from: Xray Knee 3 Views, Right (01.28.21 @ 00:49) >      EXAM:  KNEE AP.LAT&OBL.RIGHT                            PROCEDURE DATE:  01/28/2021          INTERPRETATION:  Right knee. 3 views. Patient has local pain without antecedent trauma.    There is no definite effusion. There are mild degenerative changes. No fracture.    IMPRESSION: Mild degeneration.      A/P: 51 y/o male h/o gout, DM, HTN and psg hx of right knee sxg, appendectomy and cholecystectomy  presenting with acute on chronic pain in multiple joints.    Polyarticular arthralgia: likely due to gouty arthritis, less likely septic arthritis, cont pain control, colchicine, PT  consult,  f/u CT of right knee  RUTH: RUTH vs CKD likely due to DM. f/u renal US, avoid nephrotoxins   DM: cont HSSC   DVT ppx: cont Heparin    Consulted ortho. will hold off steroids for now until septic arthritis is ruled out.

## 2021-01-28 NOTE — ED PROVIDER NOTE - OBJECTIVE STATEMENT
51 y/o male h/o gout, DM, HTN, presenting with acute on chronic pain in multiple joints worsening over 1 day. Pt states that he has chronic pain in his hips, knee and R shoulder and today this pain is consistent in quality however more severe. Pt took ibuprofen at 7pm with minimal improvement in symptoms. Pt continues to ambulate and denies fever, joint swelling, skin changes, chest pain, shortness of breath, rashes, syncope, or other recent illnesses or hospitalizations.

## 2021-01-29 DIAGNOSIS — M10.9 GOUT, UNSPECIFIED: ICD-10-CM

## 2021-01-29 DIAGNOSIS — M25.461 EFFUSION, RIGHT KNEE: ICD-10-CM

## 2021-01-29 LAB
ALBUMIN SERPL ELPH-MCNC: 3.8 G/DL — SIGNIFICANT CHANGE UP (ref 3.5–5)
ALP SERPL-CCNC: 145 U/L — HIGH (ref 40–120)
ALT FLD-CCNC: 51 U/L DA — SIGNIFICANT CHANGE UP (ref 10–60)
ANION GAP SERPL CALC-SCNC: 6 MMOL/L — SIGNIFICANT CHANGE UP (ref 5–17)
AST SERPL-CCNC: 41 U/L — HIGH (ref 10–40)
BILIRUB SERPL-MCNC: 0.7 MG/DL — SIGNIFICANT CHANGE UP (ref 0.2–1.2)
BUN SERPL-MCNC: 17 MG/DL — SIGNIFICANT CHANGE UP (ref 7–18)
CALCIUM SERPL-MCNC: 9.4 MG/DL — SIGNIFICANT CHANGE UP (ref 8.4–10.5)
CHLORIDE SERPL-SCNC: 102 MMOL/L — SIGNIFICANT CHANGE UP (ref 96–108)
CO2 SERPL-SCNC: 27 MMOL/L — SIGNIFICANT CHANGE UP (ref 22–31)
CREAT SERPL-MCNC: 1.3 MG/DL — SIGNIFICANT CHANGE UP (ref 0.5–1.3)
GLUCOSE BLDC GLUCOMTR-MCNC: 170 MG/DL — HIGH (ref 70–99)
GLUCOSE BLDC GLUCOMTR-MCNC: 172 MG/DL — HIGH (ref 70–99)
GLUCOSE BLDC GLUCOMTR-MCNC: 263 MG/DL — HIGH (ref 70–99)
GLUCOSE BLDC GLUCOMTR-MCNC: 306 MG/DL — HIGH (ref 70–99)
GLUCOSE BLDC GLUCOMTR-MCNC: 328 MG/DL — HIGH (ref 70–99)
GLUCOSE SERPL-MCNC: 203 MG/DL — HIGH (ref 70–99)
HCT VFR BLD CALC: 42.5 % — SIGNIFICANT CHANGE UP (ref 39–50)
HGB BLD-MCNC: 14 G/DL — SIGNIFICANT CHANGE UP (ref 13–17)
MCHC RBC-ENTMCNC: 28.2 PG — SIGNIFICANT CHANGE UP (ref 27–34)
MCHC RBC-ENTMCNC: 32.9 GM/DL — SIGNIFICANT CHANGE UP (ref 32–36)
MCV RBC AUTO: 85.7 FL — SIGNIFICANT CHANGE UP (ref 80–100)
NRBC # BLD: 0 /100 WBCS — SIGNIFICANT CHANGE UP (ref 0–0)
PLATELET # BLD AUTO: 254 K/UL — SIGNIFICANT CHANGE UP (ref 150–400)
POTASSIUM SERPL-MCNC: 4.6 MMOL/L — SIGNIFICANT CHANGE UP (ref 3.5–5.3)
POTASSIUM SERPL-SCNC: 4.6 MMOL/L — SIGNIFICANT CHANGE UP (ref 3.5–5.3)
PROT SERPL-MCNC: 7.9 G/DL — SIGNIFICANT CHANGE UP (ref 6–8.3)
RBC # BLD: 4.96 M/UL — SIGNIFICANT CHANGE UP (ref 4.2–5.8)
RBC # FLD: 12.1 % — SIGNIFICANT CHANGE UP (ref 10.3–14.5)
SODIUM SERPL-SCNC: 135 MMOL/L — SIGNIFICANT CHANGE UP (ref 135–145)
SYNOVIAL CRYSTALS CLARITY: ABNORMAL
SYNOVIAL CRYSTALS COLOR: ABNORMAL
SYNOVIAL CRYSTALS ID: ABNORMAL
SYNOVIAL CRYSTALS TUBE: SIGNIFICANT CHANGE UP
WBC # BLD: 10.56 K/UL — HIGH (ref 3.8–10.5)
WBC # FLD AUTO: 10.56 K/UL — HIGH (ref 3.8–10.5)

## 2021-01-29 PROCEDURE — 73700 CT LOWER EXTREMITY W/O DYE: CPT | Mod: 26,RT

## 2021-01-29 PROCEDURE — 99232 SBSQ HOSP IP/OBS MODERATE 35: CPT

## 2021-01-29 PROCEDURE — 99231 SBSQ HOSP IP/OBS SF/LOW 25: CPT

## 2021-01-29 PROCEDURE — 76376 3D RENDER W/INTRP POSTPROCES: CPT | Mod: 26

## 2021-01-29 RX ORDER — OXYCODONE HYDROCHLORIDE 5 MG/1
5 TABLET ORAL EVERY 4 HOURS
Refills: 0 | Status: DISCONTINUED | OUTPATIENT
Start: 2021-01-29 | End: 2021-02-03

## 2021-01-29 RX ORDER — OXYCODONE HYDROCHLORIDE 5 MG/1
5 TABLET ORAL EVERY 6 HOURS
Refills: 0 | Status: DISCONTINUED | OUTPATIENT
Start: 2021-01-29 | End: 2021-01-29

## 2021-01-29 RX ORDER — ENOXAPARIN SODIUM 100 MG/ML
40 INJECTION SUBCUTANEOUS DAILY
Refills: 0 | Status: DISCONTINUED | OUTPATIENT
Start: 2021-01-29 | End: 2021-02-03

## 2021-01-29 RX ADMIN — PANTOPRAZOLE SODIUM 40 MILLIGRAM(S): 20 TABLET, DELAYED RELEASE ORAL at 06:23

## 2021-01-29 RX ADMIN — LIDOCAINE 1 PATCH: 4 CREAM TOPICAL at 02:09

## 2021-01-29 RX ADMIN — ENOXAPARIN SODIUM 40 MILLIGRAM(S): 100 INJECTION SUBCUTANEOUS at 12:18

## 2021-01-29 RX ADMIN — Medication 0.6 MILLIGRAM(S): at 17:23

## 2021-01-29 RX ADMIN — SODIUM CHLORIDE 3 MILLILITER(S): 9 INJECTION INTRAMUSCULAR; INTRAVENOUS; SUBCUTANEOUS at 12:26

## 2021-01-29 RX ADMIN — SODIUM CHLORIDE 3 MILLILITER(S): 9 INJECTION INTRAMUSCULAR; INTRAVENOUS; SUBCUTANEOUS at 22:22

## 2021-01-29 RX ADMIN — LIDOCAINE 3 PATCH: 4 CREAM TOPICAL at 20:00

## 2021-01-29 RX ADMIN — ATORVASTATIN CALCIUM 40 MILLIGRAM(S): 80 TABLET, FILM COATED ORAL at 22:22

## 2021-01-29 RX ADMIN — Medication 100 MILLIGRAM(S): at 12:16

## 2021-01-29 RX ADMIN — OXYCODONE HYDROCHLORIDE 5 MILLIGRAM(S): 5 TABLET ORAL at 15:39

## 2021-01-29 RX ADMIN — Medication 1: at 17:23

## 2021-01-29 RX ADMIN — LIDOCAINE 3 PATCH: 4 CREAM TOPICAL at 06:24

## 2021-01-29 RX ADMIN — OXYCODONE HYDROCHLORIDE 5 MILLIGRAM(S): 5 TABLET ORAL at 10:07

## 2021-01-29 RX ADMIN — Medication 25 MILLIGRAM(S): at 06:24

## 2021-01-29 RX ADMIN — Medication 1: at 08:32

## 2021-01-29 RX ADMIN — LIDOCAINE 3 PATCH: 4 CREAM TOPICAL at 12:16

## 2021-01-29 RX ADMIN — SODIUM CHLORIDE 3 MILLILITER(S): 9 INJECTION INTRAMUSCULAR; INTRAVENOUS; SUBCUTANEOUS at 06:24

## 2021-01-29 RX ADMIN — LIDOCAINE 1 PATCH: 4 CREAM TOPICAL at 02:08

## 2021-01-29 RX ADMIN — Medication 25 MILLIGRAM(S): at 13:13

## 2021-01-29 RX ADMIN — Medication 40 MILLIGRAM(S): at 15:48

## 2021-01-29 RX ADMIN — Medication 3: at 12:15

## 2021-01-29 RX ADMIN — OXYCODONE HYDROCHLORIDE 2.5 MILLIGRAM(S): 5 TABLET ORAL at 13:13

## 2021-01-29 RX ADMIN — Medication 4: at 22:51

## 2021-01-29 RX ADMIN — LIDOCAINE 3 PATCH: 4 CREAM TOPICAL at 02:09

## 2021-01-29 RX ADMIN — Medication 0.6 MILLIGRAM(S): at 06:22

## 2021-01-29 NOTE — PROGRESS NOTE ADULT - PROBLEM SELECTOR PLAN 1
-p/w acute on chronic polyarticular arthralgia, likely due to gouty arthritis  -s/p diagnostic arthrocentesis 1/28, no evidence of septic arthritis   -cont steroids   -cont colchicine   -cont NSAIDs and pain meds   -pain mgmt following    -Ortho following   -PT consult

## 2021-01-29 NOTE — PROGRESS NOTE ADULT - ASSESSMENT
Confidential Drug Utilization Report  Search Terms: [place], 1970   Search Date: 01/28/2021 14:55:30 PM   The Drug Utilization Report below displays all of the controlled substance prescriptions, if any, that your patient has filled in the last twelve months. The information displayed on this report is compiled from pharmacy submissions to the Department, and accurately reflects the information as submitted by the pharmacies.  This report was requested by: India Vicente | Reference #: [id number]   There are no results for the search terms that you entered.

## 2021-01-29 NOTE — PHYSICAL THERAPY INITIAL EVALUATION ADULT - GENERAL OBSERVATIONS, REHAB EVAL
awake, alert, NAD; + peripheral IV access on left antecubital area; + gauze wound dressing on upper right quadrant of right knee; lidocaine patch on medial aspect of right knee; left knee in slight flexion posture due to pain

## 2021-01-29 NOTE — PHYSICAL THERAPY INITIAL EVALUATION ADULT - PATIENT/FAMILY/SIGNIFICANT OTHER GOALS STATEMENT, PT EVAL
return home; ambulate, transfer, and perform ADLs independently and pain-free without an assistive device

## 2021-01-29 NOTE — PROGRESS NOTE ADULT - ASSESSMENT
51 y/o male h/o gout, DM, HTN and psg hx of right knee sxg, appendectomy and cholecystectomy  presenting with acute on chronic pain in multiple joints, started on pain meds, steroids, followed by ortho

## 2021-01-29 NOTE — PROGRESS NOTE ADULT - PROBLEM SELECTOR PLAN 1
Joint pain. Pt with generalized joint pain greatest over right knee somatic in nature due to pseudogout.  s/p arthrocentesis   Opioid pain recommendations   - Continue oxycodone 5mg po q 6 hours severe prn    Non-opioid pain recommendations   - Tylenol 1G PO q8h PRN moderate pain.   - dc nsaids and start steroids - prednisone 40mg po daily for 5 days (monitor bs)  - Lidoderm 5% patch 3 patches daily.   - Continue allopurinol 100mg PO daily.  - Continue colchicine 0.6 PO 2x/day.   Bowel Regimen  -Per primary team, pt with diarrhea 1/27  Mild pain   - Non-pharmacological pain treatment recommendations  - Warm/ Cool packs PRN   - Repositioning, elevation, imagery, relaxation, distraction.  - Physical therapy OOB if no contraindications   Nutrition consult  Recommendations discussed with primary team and RN.

## 2021-01-30 LAB
ALBUMIN SERPL ELPH-MCNC: 3.6 G/DL — SIGNIFICANT CHANGE UP (ref 3.5–5)
ALP SERPL-CCNC: 138 U/L — HIGH (ref 40–120)
ALT FLD-CCNC: 40 U/L DA — SIGNIFICANT CHANGE UP (ref 10–60)
ANION GAP SERPL CALC-SCNC: 5 MMOL/L — SIGNIFICANT CHANGE UP (ref 5–17)
AST SERPL-CCNC: 25 U/L — SIGNIFICANT CHANGE UP (ref 10–40)
BILIRUB SERPL-MCNC: 0.5 MG/DL — SIGNIFICANT CHANGE UP (ref 0.2–1.2)
BUN SERPL-MCNC: 25 MG/DL — HIGH (ref 7–18)
CALCIUM SERPL-MCNC: 9.8 MG/DL — SIGNIFICANT CHANGE UP (ref 8.4–10.5)
CHLORIDE SERPL-SCNC: 101 MMOL/L — SIGNIFICANT CHANGE UP (ref 96–108)
CO2 SERPL-SCNC: 29 MMOL/L — SIGNIFICANT CHANGE UP (ref 22–31)
CREAT SERPL-MCNC: 1.28 MG/DL — SIGNIFICANT CHANGE UP (ref 0.5–1.3)
GLUCOSE BLDC GLUCOMTR-MCNC: 199 MG/DL — HIGH (ref 70–99)
GLUCOSE BLDC GLUCOMTR-MCNC: 316 MG/DL — HIGH (ref 70–99)
GLUCOSE BLDC GLUCOMTR-MCNC: 351 MG/DL — HIGH (ref 70–99)
GLUCOSE BLDC GLUCOMTR-MCNC: 391 MG/DL — HIGH (ref 70–99)
GLUCOSE SERPL-MCNC: 222 MG/DL — HIGH (ref 70–99)
HCT VFR BLD CALC: 42.5 % — SIGNIFICANT CHANGE UP (ref 39–50)
HGB BLD-MCNC: 14.3 G/DL — SIGNIFICANT CHANGE UP (ref 13–17)
MCHC RBC-ENTMCNC: 28.5 PG — SIGNIFICANT CHANGE UP (ref 27–34)
MCHC RBC-ENTMCNC: 33.6 GM/DL — SIGNIFICANT CHANGE UP (ref 32–36)
MCV RBC AUTO: 84.8 FL — SIGNIFICANT CHANGE UP (ref 80–100)
NRBC # BLD: 0 /100 WBCS — SIGNIFICANT CHANGE UP (ref 0–0)
PLATELET # BLD AUTO: 265 K/UL — SIGNIFICANT CHANGE UP (ref 150–400)
POTASSIUM SERPL-MCNC: 4.8 MMOL/L — SIGNIFICANT CHANGE UP (ref 3.5–5.3)
POTASSIUM SERPL-SCNC: 4.8 MMOL/L — SIGNIFICANT CHANGE UP (ref 3.5–5.3)
PROT SERPL-MCNC: 8.4 G/DL — HIGH (ref 6–8.3)
RBC # BLD: 5.01 M/UL — SIGNIFICANT CHANGE UP (ref 4.2–5.8)
RBC # FLD: 12.2 % — SIGNIFICANT CHANGE UP (ref 10.3–14.5)
SODIUM SERPL-SCNC: 135 MMOL/L — SIGNIFICANT CHANGE UP (ref 135–145)
WBC # BLD: 10.47 K/UL — SIGNIFICANT CHANGE UP (ref 3.8–10.5)
WBC # FLD AUTO: 10.47 K/UL — SIGNIFICANT CHANGE UP (ref 3.8–10.5)

## 2021-01-30 PROCEDURE — 99232 SBSQ HOSP IP/OBS MODERATE 35: CPT

## 2021-01-30 PROCEDURE — 99231 SBSQ HOSP IP/OBS SF/LOW 25: CPT

## 2021-01-30 RX ORDER — LISINOPRIL 2.5 MG/1
10 TABLET ORAL DAILY
Refills: 0 | Status: DISCONTINUED | OUTPATIENT
Start: 2021-01-30 | End: 2021-01-30

## 2021-01-30 RX ORDER — LISINOPRIL 2.5 MG/1
5 TABLET ORAL AT BEDTIME
Refills: 0 | Status: DISCONTINUED | OUTPATIENT
Start: 2021-01-30 | End: 2021-02-01

## 2021-01-30 RX ORDER — METFORMIN HYDROCHLORIDE 850 MG/1
1000 TABLET ORAL
Refills: 0 | Status: DISCONTINUED | OUTPATIENT
Start: 2021-01-30 | End: 2021-02-01

## 2021-01-30 RX ADMIN — Medication 5: at 21:27

## 2021-01-30 RX ADMIN — OXYCODONE HYDROCHLORIDE 5 MILLIGRAM(S): 5 TABLET ORAL at 21:26

## 2021-01-30 RX ADMIN — SODIUM CHLORIDE 3 MILLILITER(S): 9 INJECTION INTRAMUSCULAR; INTRAVENOUS; SUBCUTANEOUS at 05:59

## 2021-01-30 RX ADMIN — Medication 100 MILLIGRAM(S): at 12:23

## 2021-01-30 RX ADMIN — LIDOCAINE 3 PATCH: 4 CREAM TOPICAL at 19:46

## 2021-01-30 RX ADMIN — OXYCODONE HYDROCHLORIDE 5 MILLIGRAM(S): 5 TABLET ORAL at 00:01

## 2021-01-30 RX ADMIN — Medication 0.6 MILLIGRAM(S): at 06:00

## 2021-01-30 RX ADMIN — METFORMIN HYDROCHLORIDE 1000 MILLIGRAM(S): 850 TABLET ORAL at 19:02

## 2021-01-30 RX ADMIN — LIDOCAINE 3 PATCH: 4 CREAM TOPICAL at 12:23

## 2021-01-30 RX ADMIN — LISINOPRIL 5 MILLIGRAM(S): 2.5 TABLET ORAL at 21:29

## 2021-01-30 RX ADMIN — Medication 4: at 17:02

## 2021-01-30 RX ADMIN — LIDOCAINE 3 PATCH: 4 CREAM TOPICAL at 00:03

## 2021-01-30 RX ADMIN — Medication 10 MILLIGRAM(S): at 19:02

## 2021-01-30 RX ADMIN — Medication 5: at 11:52

## 2021-01-30 RX ADMIN — Medication 0.6 MILLIGRAM(S): at 18:26

## 2021-01-30 RX ADMIN — ENOXAPARIN SODIUM 40 MILLIGRAM(S): 100 INJECTION SUBCUTANEOUS at 12:23

## 2021-01-30 RX ADMIN — ATORVASTATIN CALCIUM 40 MILLIGRAM(S): 80 TABLET, FILM COATED ORAL at 21:27

## 2021-01-30 RX ADMIN — SODIUM CHLORIDE 3 MILLILITER(S): 9 INJECTION INTRAMUSCULAR; INTRAVENOUS; SUBCUTANEOUS at 14:06

## 2021-01-30 RX ADMIN — Medication 40 MILLIGRAM(S): at 06:00

## 2021-01-30 RX ADMIN — Medication 1000 MILLIGRAM(S): at 12:35

## 2021-01-30 RX ADMIN — PANTOPRAZOLE SODIUM 40 MILLIGRAM(S): 20 TABLET, DELAYED RELEASE ORAL at 06:00

## 2021-01-30 NOTE — PROGRESS NOTE ADULT - ASSESSMENT
Confidential Drug Utilization Report  Search Terms: [place], 1970   Search Date: 01/28/2021 14:55:30 PM   The Drug Utilization Report below displays all of the controlled substance prescriptions, if any, that your patient has filled in the last twelve months. The information displayed on this report is compiled from pharmacy submissions to the Department, and accurately reflects the information as submitted by the pharmacies.  This report was requested by: India Vicente | Reference #: [id number]   There are no results for the search terms that you entered.

## 2021-01-30 NOTE — PROGRESS NOTE ADULT - ASSESSMENT
Medicine Progress Note    Patient is a 50y old  Male who presents with a chief complaint of joint pain (30 Jan 2021 17:02)      SUBJECTIVE / OVERNIGHT EVENTS: pain in right knee mildly decreased, uncontrolled glucose    ADDITIONAL REVIEW OF SYSTEMS: all other systems reviewed and negative except as noted    MEDICATIONS  (STANDING):  allopurinol 100 milliGRAM(s) Oral daily  atorvastatin 40 milliGRAM(s) Oral at bedtime  colchicine 0.6 milliGRAM(s) Oral two times a day  enoxaparin Injectable 40 milliGRAM(s) SubCutaneous daily  glipiZIDE 10 milliGRAM(s) Oral two times a day  insulin lispro (ADMELOG) corrective regimen sliding scale   SubCutaneous Before meals and at bedtime  lidocaine   Patch 3 Patch Transdermal daily  lisinopril 5 milliGRAM(s) Oral at bedtime  metFORMIN 1000 milliGRAM(s) Oral two times a day  pantoprazole    Tablet 40 milliGRAM(s) Oral before breakfast  predniSONE   Tablet 40 milliGRAM(s) Oral daily  sodium chloride 0.9% lock flush 3 milliLiter(s) IV Push every 8 hours    MEDICATIONS  (PRN):  acetaminophen   Tablet .. 1000 milliGRAM(s) Oral every 8 hours PRN Moderate Pain (4 - 6)  oxyCODONE    IR 5 milliGRAM(s) Oral every 4 hours PRN Severe Pain (7 - 10)    CAPILLARY BLOOD GLUCOSE      POCT Blood Glucose.: 316 mg/dL (30 Jan 2021 16:44)  POCT Blood Glucose.: 351 mg/dL (30 Jan 2021 11:46)  POCT Blood Glucose.: 199 mg/dL (30 Jan 2021 07:38)  POCT Blood Glucose.: 328 mg/dL (29 Jan 2021 22:49)  POCT Blood Glucose.: 306 mg/dL (29 Jan 2021 21:14)    I&O's Summary    29 Jan 2021 07:01  -  30 Jan 2021 07:00  --------------------------------------------------------  IN: 0 mL / OUT: 600 mL / NET: -600 mL        PHYSICAL EXAM:  Vital Signs Last 24 Hrs  T(C): 36.6 (30 Jan 2021 14:04), Max: 36.6 (30 Jan 2021 14:04)  T(F): 97.9 (30 Jan 2021 14:04), Max: 97.9 (30 Jan 2021 14:04)  HR: 73 (30 Jan 2021 14:04) (60 - 73)  BP: 155/84 (30 Jan 2021 14:04) (119/74 - 155/84)  BP(mean): --  RR: 18 (30 Jan 2021 14:04) (18 - 18)  SpO2: 97% (30 Jan 2021 14:04) (96% - 97%)  CONSTITUTIONAL: [place] uncomfortable  ENMT: Moist oral mucosa, no pharyngeal injection or exudates; normal dentition  RESPIRATORY: Normal respiratory effort; lungs are clear to auscultation bilaterally  CARDIOVASCULAR: Regular rate and rhythm, normal S1 and S2, no murmur/rub/gallop; No lower extremity edema; Peripheral pulses are 2+ bilaterally  ABDOMEN: Nontender to palpation, normoactive bowel sounds, no rebound/guarding; No hepatosplenomegaly  PSYCH: A+O to person, place, and time; affect appropriate  NEUROLOGY: CN 2-12 are intact and symmetric; no gross sensory deficits   SKIN: No rashes; no palpable lesions  Ext: Right knee swollen and tender, unable to bend right knee    LABS:                        14.3   10.47 )-----------( 265      ( 30 Jan 2021 08:33 )             42.5     01-30    135  |  101  |  25<H>  ----------------------------<  222<H>  4.8   |  29  |  1.28    Ca    9.8      30 Jan 2021 08:33    TPro  8.4<H>  /  Alb  3.6  /  TBili  0.5  /  DBili  x   /  AST  25  /  ALT  40  /  AlkPhos  138<H>  01-30              Culture - Body Fluid with Gram Stain (collected 28 Jan 2021 22:00)  Source: .Body Fluid Synovial Fluid  Gram Stain (28 Jan 2021 23:47):    polymorphonuclear leukocytes seen    No organisms seen    by cytocentrifuge  Preliminary Report (29 Jan 2021 17:52):    No growth    Culture - Blood (collected 28 Jan 2021 22:00)  Source: .Blood Blood  Preliminary Report (29 Jan 2021 23:01):    No growth to date.    Culture - Blood (collected 28 Jan 2021 22:00)  Source: .Blood Blood  Preliminary Report (29 Jan 2021 23:01):    No growth to date.      COVID-19 PCR: NotDetec (28 Jan 2021 05:07)      RADIOLOGY & ADDITIONAL TESTS:  Imaging from Last 24 Hours:    Electrocardiogram/QTc Interval:    COORDINATION OF CARE:  Care Discussed with Consultants/Other Providers:

## 2021-01-31 LAB
ALBUMIN SERPL ELPH-MCNC: 3.4 G/DL — LOW (ref 3.5–5)
ALBUMIN SERPL ELPH-MCNC: 3.7 G/DL — SIGNIFICANT CHANGE UP (ref 3.5–5)
ALP SERPL-CCNC: 188 U/L — HIGH (ref 40–120)
ALP SERPL-CCNC: 191 U/L — HIGH (ref 40–120)
ALT FLD-CCNC: 166 U/L DA — HIGH (ref 10–60)
ALT FLD-CCNC: 199 U/L DA — HIGH (ref 10–60)
ANION GAP SERPL CALC-SCNC: 7 MMOL/L — SIGNIFICANT CHANGE UP (ref 5–17)
ANION GAP SERPL CALC-SCNC: 8 MMOL/L — SIGNIFICANT CHANGE UP (ref 5–17)
AST SERPL-CCNC: 103 U/L — HIGH (ref 10–40)
AST SERPL-CCNC: 235 U/L — HIGH (ref 10–40)
BILIRUB SERPL-MCNC: 0.3 MG/DL — SIGNIFICANT CHANGE UP (ref 0.2–1.2)
BILIRUB SERPL-MCNC: 0.5 MG/DL — SIGNIFICANT CHANGE UP (ref 0.2–1.2)
BUN SERPL-MCNC: 28 MG/DL — HIGH (ref 7–18)
BUN SERPL-MCNC: 30 MG/DL — HIGH (ref 7–18)
CALCIUM SERPL-MCNC: 9.1 MG/DL — SIGNIFICANT CHANGE UP (ref 8.4–10.5)
CALCIUM SERPL-MCNC: 9.3 MG/DL — SIGNIFICANT CHANGE UP (ref 8.4–10.5)
CHLORIDE SERPL-SCNC: 97 MMOL/L — SIGNIFICANT CHANGE UP (ref 96–108)
CHLORIDE SERPL-SCNC: 97 MMOL/L — SIGNIFICANT CHANGE UP (ref 96–108)
CO2 SERPL-SCNC: 26 MMOL/L — SIGNIFICANT CHANGE UP (ref 22–31)
CO2 SERPL-SCNC: 28 MMOL/L — SIGNIFICANT CHANGE UP (ref 22–31)
CREAT SERPL-MCNC: 1.48 MG/DL — HIGH (ref 0.5–1.3)
CREAT SERPL-MCNC: 1.49 MG/DL — HIGH (ref 0.5–1.3)
GLUCOSE BLDC GLUCOMTR-MCNC: 111 MG/DL — HIGH (ref 70–99)
GLUCOSE BLDC GLUCOMTR-MCNC: 183 MG/DL — HIGH (ref 70–99)
GLUCOSE BLDC GLUCOMTR-MCNC: 248 MG/DL — HIGH (ref 70–99)
GLUCOSE BLDC GLUCOMTR-MCNC: 321 MG/DL — HIGH (ref 70–99)
GLUCOSE BLDC GLUCOMTR-MCNC: 327 MG/DL — HIGH (ref 70–99)
GLUCOSE SERPL-MCNC: 284 MG/DL — HIGH (ref 70–99)
GLUCOSE SERPL-MCNC: 410 MG/DL — HIGH (ref 70–99)
HCT VFR BLD CALC: 41.9 % — SIGNIFICANT CHANGE UP (ref 39–50)
HGB BLD-MCNC: 14.2 G/DL — SIGNIFICANT CHANGE UP (ref 13–17)
MCHC RBC-ENTMCNC: 28.9 PG — SIGNIFICANT CHANGE UP (ref 27–34)
MCHC RBC-ENTMCNC: 33.9 GM/DL — SIGNIFICANT CHANGE UP (ref 32–36)
MCV RBC AUTO: 85.2 FL — SIGNIFICANT CHANGE UP (ref 80–100)
NRBC # BLD: 0 /100 WBCS — SIGNIFICANT CHANGE UP (ref 0–0)
PLATELET # BLD AUTO: 317 K/UL — SIGNIFICANT CHANGE UP (ref 150–400)
POTASSIUM SERPL-MCNC: 4.6 MMOL/L — SIGNIFICANT CHANGE UP (ref 3.5–5.3)
POTASSIUM SERPL-MCNC: 5.5 MMOL/L — HIGH (ref 3.5–5.3)
POTASSIUM SERPL-SCNC: 4.6 MMOL/L — SIGNIFICANT CHANGE UP (ref 3.5–5.3)
POTASSIUM SERPL-SCNC: 5.5 MMOL/L — HIGH (ref 3.5–5.3)
PROT SERPL-MCNC: 7.6 G/DL — SIGNIFICANT CHANGE UP (ref 6–8.3)
PROT SERPL-MCNC: 7.9 G/DL — SIGNIFICANT CHANGE UP (ref 6–8.3)
RBC # BLD: 4.92 M/UL — SIGNIFICANT CHANGE UP (ref 4.2–5.8)
RBC # FLD: 12.4 % — SIGNIFICANT CHANGE UP (ref 10.3–14.5)
SODIUM SERPL-SCNC: 131 MMOL/L — LOW (ref 135–145)
SODIUM SERPL-SCNC: 132 MMOL/L — LOW (ref 135–145)
WBC # BLD: 10.18 K/UL — SIGNIFICANT CHANGE UP (ref 3.8–10.5)
WBC # FLD AUTO: 10.18 K/UL — SIGNIFICANT CHANGE UP (ref 3.8–10.5)

## 2021-01-31 PROCEDURE — 99232 SBSQ HOSP IP/OBS MODERATE 35: CPT

## 2021-01-31 RX ORDER — COLCHICINE 0.6 MG
1 TABLET ORAL
Qty: 0 | Refills: 0 | DISCHARGE
Start: 2021-01-31

## 2021-01-31 RX ORDER — INSULIN GLARGINE 100 [IU]/ML
10 INJECTION, SOLUTION SUBCUTANEOUS AT BEDTIME
Refills: 0 | Status: DISCONTINUED | OUTPATIENT
Start: 2021-01-31 | End: 2021-02-03

## 2021-01-31 RX ADMIN — LIDOCAINE 3 PATCH: 4 CREAM TOPICAL at 19:00

## 2021-01-31 RX ADMIN — LIDOCAINE 3 PATCH: 4 CREAM TOPICAL at 01:46

## 2021-01-31 RX ADMIN — SODIUM CHLORIDE 3 MILLILITER(S): 9 INJECTION INTRAMUSCULAR; INTRAVENOUS; SUBCUTANEOUS at 13:07

## 2021-01-31 RX ADMIN — ENOXAPARIN SODIUM 40 MILLIGRAM(S): 100 INJECTION SUBCUTANEOUS at 11:19

## 2021-01-31 RX ADMIN — Medication 40 MILLIGRAM(S): at 05:40

## 2021-01-31 RX ADMIN — Medication 4: at 11:46

## 2021-01-31 RX ADMIN — PANTOPRAZOLE SODIUM 40 MILLIGRAM(S): 20 TABLET, DELAYED RELEASE ORAL at 05:41

## 2021-01-31 RX ADMIN — Medication 4: at 16:16

## 2021-01-31 RX ADMIN — Medication 10 MILLIGRAM(S): at 05:41

## 2021-01-31 RX ADMIN — LIDOCAINE 3 PATCH: 4 CREAM TOPICAL at 11:19

## 2021-01-31 RX ADMIN — SODIUM CHLORIDE 3 MILLILITER(S): 9 INJECTION INTRAMUSCULAR; INTRAVENOUS; SUBCUTANEOUS at 01:42

## 2021-01-31 RX ADMIN — Medication 0.6 MILLIGRAM(S): at 17:07

## 2021-01-31 RX ADMIN — METFORMIN HYDROCHLORIDE 1000 MILLIGRAM(S): 850 TABLET ORAL at 17:07

## 2021-01-31 RX ADMIN — SODIUM CHLORIDE 3 MILLILITER(S): 9 INJECTION INTRAMUSCULAR; INTRAVENOUS; SUBCUTANEOUS at 21:09

## 2021-01-31 RX ADMIN — Medication 1: at 22:04

## 2021-01-31 RX ADMIN — OXYCODONE HYDROCHLORIDE 5 MILLIGRAM(S): 5 TABLET ORAL at 20:21

## 2021-01-31 RX ADMIN — OXYCODONE HYDROCHLORIDE 5 MILLIGRAM(S): 5 TABLET ORAL at 01:44

## 2021-01-31 RX ADMIN — LIDOCAINE 3 PATCH: 4 CREAM TOPICAL at 23:00

## 2021-01-31 RX ADMIN — SODIUM CHLORIDE 3 MILLILITER(S): 9 INJECTION INTRAMUSCULAR; INTRAVENOUS; SUBCUTANEOUS at 07:23

## 2021-01-31 RX ADMIN — OXYCODONE HYDROCHLORIDE 5 MILLIGRAM(S): 5 TABLET ORAL at 08:46

## 2021-01-31 RX ADMIN — Medication 100 MILLIGRAM(S): at 11:18

## 2021-01-31 RX ADMIN — Medication 10 MILLIGRAM(S): at 17:07

## 2021-01-31 RX ADMIN — INSULIN GLARGINE 10 UNIT(S): 100 INJECTION, SOLUTION SUBCUTANEOUS at 22:06

## 2021-01-31 RX ADMIN — OXYCODONE HYDROCHLORIDE 5 MILLIGRAM(S): 5 TABLET ORAL at 14:33

## 2021-01-31 RX ADMIN — LISINOPRIL 5 MILLIGRAM(S): 2.5 TABLET ORAL at 22:04

## 2021-01-31 RX ADMIN — Medication 0.6 MILLIGRAM(S): at 05:41

## 2021-01-31 RX ADMIN — METFORMIN HYDROCHLORIDE 1000 MILLIGRAM(S): 850 TABLET ORAL at 05:41

## 2021-01-31 NOTE — DISCHARGE NOTE PROVIDER - NSDCCPCAREPLAN_GEN_ALL_CORE_FT
PRINCIPAL DISCHARGE DIAGNOSIS  Diagnosis: Joint pain  Assessment and Plan of Treatment: You were admitted with right knee pain. Since admission, you underwent a right knee arthorscopy to evaluate for septic knee; there was no evidence of infection. Therefore, given concern for gout, you were started on Colchicine to treat the pain. Please take all of your medication as prescribed.          PRINCIPAL DISCHARGE DIAGNOSIS  Diagnosis: Joint pain  Assessment and Plan of Treatment: You were admitted with right knee pain. Since admission, you underwent a right knee arthorscopy to evaluate for septic knee; there was no evidence of infection. Therefore, given concern for gout, you were started on Colchicine to treat the pain. Please take all of your medication as prescribed.         SECONDARY DISCHARGE DIAGNOSES  Diagnosis: DM (diabetes mellitus)  Assessment and Plan of Treatment: HgA1C this admission 8.0.  Which means your average blood glucose has been 183.  The goal is to keep your glucose between .  Make sure you get your HgA1c checked every three months.  If you take oral diabetes medications, check your blood glucose two times a day.  If you take insulin, check your blood glucose before meals and at bedtime.  It's important not to skip any meals.  Keep a log of your blood glucose results and always take it with you to your doctor appointments.  Keep a list of your current medications including injectables and over the counter medications and bring this medication list with you to all your doctor appointments.  If you have not seen your ophthalmologist this year call for appointment.  Check your feet daily for redness, sores, or openings. Do not self treat. If no improvement in two days call your primary care physician for an appointment.  Low blood sugar (hypoglycemia) is a blood sugar below 70mg/dl. Check your blood sugar if you feel signs/symptoms of hypoglycemia. If your blood sugar is below 70 take 15 grams of carbohydrates (ex 4 oz of apple juice, 3-4 glucose tablets, or 4-6 oz of regular soda) wait 15 minutes and repeat blood sugar to make sure it comes up above 70.  If your blood sugar is above 70 and you are due for a meal, have a meal.  If you are not due for a meal have a snack.  This snack helps keeps your blood sugar at a safe range.       PRINCIPAL DISCHARGE DIAGNOSIS  Diagnosis: Joint pain  Assessment and Plan of Treatment: You were admitted with right knee pain. Since admission, you underwent a right knee arthorscopy to evaluate for septic knee; there was no evidence of infection. You were diagnosed with pseudogout and given a regimen of colchicine, prednisone, and allopurinol. You finished the prednisone course. Please continue to take all of your medications as prescribed.         SECONDARY DISCHARGE DIAGNOSES  Diagnosis: DM (diabetes mellitus)  Assessment and Plan of Treatment: HgA1C this admission 8.0.  Which means your average blood glucose has been 183.  The goal is to keep your glucose between .  Make sure you get your HgA1c checked every three months.  If you take oral diabetes medications, check your blood glucose two times a day.  If you take insulin, check your blood glucose before meals and at bedtime.  It's important not to skip any meals.  Keep a log of your blood glucose results and always take it with you to your doctor appointments.  Keep a list of your current medications including injectables and over the counter medications and bring this medication list with you to all your doctor appointments.  If you have not seen your ophthalmologist this year call for appointment.  Check your feet daily for redness, sores, or openings. Do not self treat. If no improvement in two days call your primary care physician for an appointment.  Low blood sugar (hypoglycemia) is a blood sugar below 70mg/dl. Check your blood sugar if you feel signs/symptoms of hypoglycemia. If your blood sugar is below 70 take 15 grams of carbohydrates (ex 4 oz of apple juice, 3-4 glucose tablets, or 4-6 oz of regular soda) wait 15 minutes and repeat blood sugar to make sure it comes up above 70.  If your blood sugar is above 70 and you are due for a meal, have a meal.  If you are not due for a meal have a snack.  This snack helps keeps your blood sugar at a safe range.

## 2021-01-31 NOTE — PROGRESS NOTE ADULT - ASSESSMENT
Medicine Progress Note    Patient is a 50y old  Male who presents with a chief complaint of joint pain (31 Jan 2021 13:07)      SUBJECTIVE / OVERNIGHT EVENTS:    ADDITIONAL REVIEW OF SYSTEMS:    MEDICATIONS  (STANDING):  allopurinol 100 milliGRAM(s) Oral daily  atorvastatin 40 milliGRAM(s) Oral at bedtime  colchicine 0.6 milliGRAM(s) Oral two times a day  enoxaparin Injectable 40 milliGRAM(s) SubCutaneous daily  glipiZIDE 10 milliGRAM(s) Oral two times a day  insulin glargine Injectable (LANTUS) 10 Unit(s) SubCutaneous at bedtime  insulin lispro (ADMELOG) corrective regimen sliding scale   SubCutaneous Before meals and at bedtime  lidocaine   Patch 3 Patch Transdermal daily  lisinopril 5 milliGRAM(s) Oral at bedtime  metFORMIN 1000 milliGRAM(s) Oral two times a day  pantoprazole    Tablet 40 milliGRAM(s) Oral before breakfast  predniSONE   Tablet 40 milliGRAM(s) Oral daily  sodium chloride 0.9% lock flush 3 milliLiter(s) IV Push every 8 hours    MEDICATIONS  (PRN):  acetaminophen   Tablet .. 1000 milliGRAM(s) Oral every 8 hours PRN Moderate Pain (4 - 6)  oxyCODONE    IR 5 milliGRAM(s) Oral every 4 hours PRN Severe Pain (7 - 10)    CAPILLARY BLOOD GLUCOSE      POCT Blood Glucose.: 321 mg/dL (31 Jan 2021 11:26)  POCT Blood Glucose.: 111 mg/dL (31 Jan 2021 07:51)  POCT Blood Glucose.: 248 mg/dL (31 Jan 2021 00:17)  POCT Blood Glucose.: 391 mg/dL (30 Jan 2021 21:03)  POCT Blood Glucose.: 316 mg/dL (30 Jan 2021 16:44)    I&O's Summary    30 Jan 2021 07:01  -  31 Jan 2021 07:00  --------------------------------------------------------  IN: 60 mL / OUT: 450 mL / NET: -390 mL        PHYSICAL EXAM:  Vital Signs Last 24 Hrs  T(C): 36.6 (31 Jan 2021 13:40), Max: 36.7 (31 Jan 2021 04:19)  T(F): 97.9 (31 Jan 2021 13:40), Max: 98 (31 Jan 2021 04:19)  HR: 76 (31 Jan 2021 13:40) (58 - 76)  BP: 137/72 (31 Jan 2021 13:40) (124/76 - 155/84)  BP(mean): --  RR: 18 (31 Jan 2021 13:40) (17 - 18)  SpO2: 96% (31 Jan 2021 13:40) (96% - 97%)      CONSTITUTIONAL: Looks comfortable today  ENMT: Moist oral mucosa, no pharyngeal injection or exudates; normal dentition  RESPIRATORY: Normal respiratory effort; lungs are clear to auscultation bilaterally  CARDIOVASCULAR: Regular rate and rhythm, normal S1 and S2, no murmur/rub/gallop; No lower extremity edema; Peripheral pulses are 2+ bilaterally  ABDOMEN: Nontender to palpation, normoactive bowel sounds, no rebound/guarding; No hepatosplenomegaly  PSYCH: A+O to person, place, and time; affect appropriate  NEUROLOGY: CN 2-12 are intact and symmetric; no gross sensory deficits   SKIN: No rashes; no palpable lesions  Ext: Right knee swollen and tender, able to bend right knee      LABS:                        14.2   10.18 )-----------( 317      ( 31 Jan 2021 09:48 )             41.9     01-31    132<L>  |  97  |  28<H>  ----------------------------<  284<H>  5.5<H>   |  28  |  1.49<H>    Ca    9.1      31 Jan 2021 09:48    TPro  7.9  /  Alb  3.7  /  TBili  0.5  /  DBili  x   /  AST  235<H>  /  ALT  199<H>  /  AlkPhos  191<H>  01-31              Culture - Body Fluid with Gram Stain (collected 28 Jan 2021 22:00)  Source: .Body Fluid Synovial Fluid  Gram Stain (28 Jan 2021 23:47):    polymorphonuclear leukocytes seen    No organisms seen    by cytocentrifuge  Preliminary Report (29 Jan 2021 17:52):    No growth    Culture - Blood (collected 28 Jan 2021 22:00)  Source: .Blood Blood  Preliminary Report (29 Jan 2021 23:01):    No growth to date.    Culture - Blood (collected 28 Jan 2021 22:00)  Source: .Blood Blood  Preliminary Report (29 Jan 2021 23:01):    No growth to date.      COVID-19 PCR: NotDetec (28 Jan 2021 05:07)      RADIOLOGY & ADDITIONAL TESTS:  Imaging from Last 24 Hours:    Electrocardiogram/QTc Interval:    COORDINATION OF CARE:  Care Discussed with Consultants/Other Providers:   Medicine Progress Note    Patient is a 50y old  Male who presents with a chief complaint of joint pain (31 Jan 2021 13:07)      SUBJECTIVE / OVERNIGHT EVENTS:    ADDITIONAL REVIEW OF SYSTEMS: All other systems are reviewed and 12 point ROS is negative except as noted    MEDICATIONS  (STANDING):  allopurinol 100 milliGRAM(s) Oral daily  atorvastatin 40 milliGRAM(s) Oral at bedtime  colchicine 0.6 milliGRAM(s) Oral two times a day  enoxaparin Injectable 40 milliGRAM(s) SubCutaneous daily  glipiZIDE 10 milliGRAM(s) Oral two times a day  insulin glargine Injectable (LANTUS) 10 Unit(s) SubCutaneous at bedtime  insulin lispro (ADMELOG) corrective regimen sliding scale   SubCutaneous Before meals and at bedtime  lidocaine   Patch 3 Patch Transdermal daily  lisinopril 5 milliGRAM(s) Oral at bedtime  metFORMIN 1000 milliGRAM(s) Oral two times a day  pantoprazole    Tablet 40 milliGRAM(s) Oral before breakfast  predniSONE   Tablet 40 milliGRAM(s) Oral daily  sodium chloride 0.9% lock flush 3 milliLiter(s) IV Push every 8 hours    MEDICATIONS  (PRN):  acetaminophen   Tablet .. 1000 milliGRAM(s) Oral every 8 hours PRN Moderate Pain (4 - 6)  oxyCODONE    IR 5 milliGRAM(s) Oral every 4 hours PRN Severe Pain (7 - 10)    CAPILLARY BLOOD GLUCOSE      POCT Blood Glucose.: 321 mg/dL (31 Jan 2021 11:26)  POCT Blood Glucose.: 111 mg/dL (31 Jan 2021 07:51)  POCT Blood Glucose.: 248 mg/dL (31 Jan 2021 00:17)  POCT Blood Glucose.: 391 mg/dL (30 Jan 2021 21:03)  POCT Blood Glucose.: 316 mg/dL (30 Jan 2021 16:44)    I&O's Summary    30 Jan 2021 07:01  -  31 Jan 2021 07:00  --------------------------------------------------------  IN: 60 mL / OUT: 450 mL / NET: -390 mL        PHYSICAL EXAM:  Vital Signs Last 24 Hrs  T(C): 36.6 (31 Jan 2021 13:40), Max: 36.7 (31 Jan 2021 04:19)  T(F): 97.9 (31 Jan 2021 13:40), Max: 98 (31 Jan 2021 04:19)  HR: 76 (31 Jan 2021 13:40) (58 - 76)  BP: 137/72 (31 Jan 2021 13:40) (124/76 - 155/84)  BP(mean): --  RR: 18 (31 Jan 2021 13:40) (17 - 18)  SpO2: 96% (31 Jan 2021 13:40) (96% - 97%)      CONSTITUTIONAL: Looks comfortable today  ENMT: Moist oral mucosa, no pharyngeal injection or exudates; normal dentition  RESPIRATORY: Normal respiratory effort; lungs are clear to auscultation bilaterally  CARDIOVASCULAR: Regular rate and rhythm, normal S1 and S2, no murmur/rub/gallop; No lower extremity edema; Peripheral pulses are 2+ bilaterally  ABDOMEN: Nontender to palpation, normoactive bowel sounds, no rebound/guarding; No hepatosplenomegaly  PSYCH: A+O to person, place, and time; affect appropriate  NEUROLOGY: CN 2-12 are intact and symmetric; no gross sensory deficits   SKIN: No rashes; no palpable lesions  Ext: Right knee swollen and tender, able to bend right knee      LABS:                        14.2   10.18 )-----------( 317      ( 31 Jan 2021 09:48 )             41.9     01-31    132<L>  |  97  |  28<H>  ----------------------------<  284<H>  5.5<H>   |  28  |  1.49<H>    Ca    9.1      31 Jan 2021 09:48    TPro  7.9  /  Alb  3.7  /  TBili  0.5  /  DBili  x   /  AST  235<H>  /  ALT  199<H>  /  AlkPhos  191<H>  01-31              Culture - Body Fluid with Gram Stain (collected 28 Jan 2021 22:00)  Source: .Body Fluid Synovial Fluid  Gram Stain (28 Jan 2021 23:47):    polymorphonuclear leukocytes seen    No organisms seen    by cytocentrifuge  Preliminary Report (29 Jan 2021 17:52):    No growth    Culture - Blood (collected 28 Jan 2021 22:00)  Source: .Blood Blood  Preliminary Report (29 Jan 2021 23:01):    No growth to date.    Culture - Blood (collected 28 Jan 2021 22:00)  Source: .Blood Blood  Preliminary Report (29 Jan 2021 23:01):    No growth to date.      COVID-19 PCR: NotDetec (28 Jan 2021 05:07)      RADIOLOGY & ADDITIONAL TESTS:  Imaging from Last 24 Hours:    Electrocardiogram/QTc Interval:    COORDINATION OF CARE:  Care Discussed with Consultants/Other Providers:

## 2021-01-31 NOTE — DISCHARGE NOTE PROVIDER - NSDCMRMEDTOKEN_GEN_ALL_CORE_FT
allopurinol 100 mg oral tablet: 1 tab(s) orally once a day  atorvastatin 40 mg oral tablet: 1 tab(s) orally once a day  colchicine 0.6 mg oral tablet: 1 tab(s) orally 2 times a day  glipiZIDE 10 mg oral tablet: 1 tab(s) orally 2 times a day  lisinopril 10 mg oral tablet: 1 tab(s) orally once a day  metFORMIN 500 mg oral tablet: 1 tab(s) orally 2 times a day  omeprazole 40 mg oral delayed release capsule: 1 cap(s) orally once a day   allopurinol 100 mg oral tablet: 1 tab(s) orally once a day  atorvastatin 40 mg oral tablet: 1 tab(s) orally once a day  colchicine 0.6 mg oral tablet: 1 tab(s) orally 2 times a day  glipiZIDE 10 mg oral tablet: 1 tab(s) orally 2 times a day  lisinopril 10 mg oral tablet: 1 tab(s) orally once a day  metFORMIN 500 mg oral tablet: 1 tab(s) orally 2 times a day  omeprazole 40 mg oral delayed release capsule: 1 cap(s) orally once a day  Jory Licona:    allopurinol 100 mg oral tablet: 1 tab(s) orally once a day  atorvastatin 40 mg oral tablet: 1 tab(s) orally once a day  colchicine 0.6 mg oral tablet: 1 tab(s) orally 2 times a day  glipiZIDE 10 mg oral tablet: 1 tab(s) orally 2 times a day  Lidoderm 5% topical film: Apply topically to affected area once a day   lisinopril 10 mg oral tablet: 1 tab(s) orally once a day  metFORMIN 500 mg oral tablet: 1 tab(s) orally 2 times a day  omeprazole 40 mg oral delayed release capsule: 1 cap(s) orally once a day  Jory Licona:

## 2021-01-31 NOTE — DISCHARGE NOTE PROVIDER - PROVIDER TOKENS
PROVIDER:[TOKEN:[[id number]],FOLLOWUP:[2 weeks]] PROVIDER:[TOKEN:[[id number]],FOLLOWUP:[2 weeks]],PROVIDER:[TOKEN:[[id number]:[id number]],FOLLOWUP:[1 week]]

## 2021-01-31 NOTE — DISCHARGE NOTE PROVIDER - HOSPITAL COURSE
1yMale patient who presents to the hospital for Patient is a 50y old  Male who presents with a chief complaint of joint pain now s/p arthroscopy. Right knee pain with pseudo - gout. RIght knee pain improving, increased flexion.  He was started on steroids and gout treatment. Patient is stable for discharge with outpatient follow up.    50 YOM patient who presents to the hospital for Patient is a 50y old  Male who presents with a chief complaint of joint pain now s/p arthroscopy. Right knee pain with pseudo - gout. RIght knee pain improving, increased flexion.  He was started on steroids and gout treatment. Patient is stable for discharge with outpatient follow up.     < from: CT 3D Reconstruct w/o Workstation (01.29.21 @ 10:08) >    FINDINGS:    There is no evidence of acute fracture or dislocation. There is mild tricompartmental osteophyte formation. There is no osseous erosion or destruction. No well-defined mineralized tophus is demonstrated.    There is a moderate to large knee joint effusion. There is no Baker's cyst. There is atherosclerotic disease.    IMPRESSION:    No evidence of osseous erosion or destruction. No well-defined mineralized tophus. Nonspecific moderate to large knee joint effusion.    < end of copied text >         50 YOM patient who presents to the hospital for Patient is a 50y old  Male who presents with a chief complaint of joint pain now s/p arthroscopy. Right knee pain with pseudo - gout. RIght knee pain improving, increased flexion.  He was started on steroids and gout treatment. Patient is stable for discharge with outpatient follow up.     < from: CT 3D Reconstruct w/o Workstation (01.29.21 @ 10:08) >    FINDINGS:    There is no evidence of acute fracture or dislocation. There is mild tricompartmental osteophyte formation. There is no osseous erosion or destruction. No well-defined mineralized tophus is demonstrated.    There is a moderate to large knee joint effusion. There is no Baker's cyst. There is atherosclerotic disease.    IMPRESSION:    No evidence of osseous erosion or destruction. No well-defined mineralized tophus. Nonspecific moderate to large knee joint effusion.    < end of copied text >    Patient treated with allopurinol, colchicine and prednisone.     Pain management consulted and recommends lidocaine patch to affected knee.    Pt A1c on admission was 8.0.  Glucose elevated while inpatient secondary to prednisone.  Patient to be d/c'd on home PO regimen and follow up with Endocrine as outpatient.    Discussed with attending.    Patient medically stable for discharge.    For full hospital course, please see medical record.

## 2021-01-31 NOTE — DISCHARGE NOTE PROVIDER - CARE PROVIDER_API CALL
Iman Garibay)  [facility]ocr[facility]ology[facility]ab[facility]abetes  [address] [place] [address]  Phone: [phone]  Fax: [phone]  Follow Up Time: 2 weeks   Iman Garibay)  [facility]ocrinology[facility]ab[facility]abetes  [address] [place] [address]  Phone: [phone]  Fax: [phone]  Follow Up Time: 2 weeks    PAMELA FATIMA  [id number]  [address] [place] [address]  Phone: [phone]  Fax: [phone]  Follow Up Time: 1 week

## 2021-02-01 DIAGNOSIS — G47.00 INSOMNIA, UNSPECIFIED: ICD-10-CM

## 2021-02-01 DIAGNOSIS — Z02.9 ENCOUNTER FOR ADMINISTRATIVE EXAMINATIONS, UNSPECIFIED: ICD-10-CM

## 2021-02-01 DIAGNOSIS — K59.00 CONSTIPATION, UNSPECIFIED: ICD-10-CM

## 2021-02-01 LAB
ALBUMIN SERPL ELPH-MCNC: 3.6 G/DL — SIGNIFICANT CHANGE UP (ref 3.5–5)
ALP SERPL-CCNC: 213 U/L — HIGH (ref 40–120)
ALT FLD-CCNC: 250 U/L DA — HIGH (ref 10–60)
ANION GAP SERPL CALC-SCNC: 6 MMOL/L — SIGNIFICANT CHANGE UP (ref 5–17)
AST SERPL-CCNC: 254 U/L — HIGH (ref 10–40)
BILIRUB SERPL-MCNC: 0.5 MG/DL — SIGNIFICANT CHANGE UP (ref 0.2–1.2)
BUN SERPL-MCNC: 29 MG/DL — HIGH (ref 7–18)
CALCIUM SERPL-MCNC: 10 MG/DL — SIGNIFICANT CHANGE UP (ref 8.4–10.5)
CHLORIDE SERPL-SCNC: 101 MMOL/L — SIGNIFICANT CHANGE UP (ref 96–108)
CO2 SERPL-SCNC: 31 MMOL/L — SIGNIFICANT CHANGE UP (ref 22–31)
CREAT SERPL-MCNC: 1.32 MG/DL — HIGH (ref 0.5–1.3)
GLUCOSE BLDC GLUCOMTR-MCNC: 198 MG/DL — HIGH (ref 70–99)
GLUCOSE BLDC GLUCOMTR-MCNC: 282 MG/DL — HIGH (ref 70–99)
GLUCOSE BLDC GLUCOMTR-MCNC: 299 MG/DL — HIGH (ref 70–99)
GLUCOSE BLDC GLUCOMTR-MCNC: 91 MG/DL — SIGNIFICANT CHANGE UP (ref 70–99)
GLUCOSE SERPL-MCNC: 100 MG/DL — HIGH (ref 70–99)
HCT VFR BLD CALC: 43.5 % — SIGNIFICANT CHANGE UP (ref 39–50)
HGB BLD-MCNC: 14.6 G/DL — SIGNIFICANT CHANGE UP (ref 13–17)
MCHC RBC-ENTMCNC: 29 PG — SIGNIFICANT CHANGE UP (ref 27–34)
MCHC RBC-ENTMCNC: 33.6 GM/DL — SIGNIFICANT CHANGE UP (ref 32–36)
MCV RBC AUTO: 86.5 FL — SIGNIFICANT CHANGE UP (ref 80–100)
NRBC # BLD: 0 /100 WBCS — SIGNIFICANT CHANGE UP (ref 0–0)
PLATELET # BLD AUTO: 363 K/UL — SIGNIFICANT CHANGE UP (ref 150–400)
POTASSIUM SERPL-MCNC: 4.3 MMOL/L — SIGNIFICANT CHANGE UP (ref 3.5–5.3)
POTASSIUM SERPL-SCNC: 4.3 MMOL/L — SIGNIFICANT CHANGE UP (ref 3.5–5.3)
PROT SERPL-MCNC: 8.1 G/DL — SIGNIFICANT CHANGE UP (ref 6–8.3)
RBC # BLD: 5.03 M/UL — SIGNIFICANT CHANGE UP (ref 4.2–5.8)
RBC # FLD: 12.3 % — SIGNIFICANT CHANGE UP (ref 10.3–14.5)
SODIUM SERPL-SCNC: 138 MMOL/L — SIGNIFICANT CHANGE UP (ref 135–145)
WBC # BLD: 10.9 K/UL — HIGH (ref 3.8–10.5)
WBC # FLD AUTO: 10.9 K/UL — HIGH (ref 3.8–10.5)

## 2021-02-01 PROCEDURE — 99232 SBSQ HOSP IP/OBS MODERATE 35: CPT

## 2021-02-01 PROCEDURE — 99231 SBSQ HOSP IP/OBS SF/LOW 25: CPT

## 2021-02-01 PROCEDURE — 76700 US EXAM ABDOM COMPLETE: CPT | Mod: 26

## 2021-02-01 RX ORDER — POLYETHYLENE GLYCOL 3350 17 G/17G
17 POWDER, FOR SOLUTION ORAL DAILY
Refills: 0 | Status: DISCONTINUED | OUTPATIENT
Start: 2021-02-01 | End: 2021-02-03

## 2021-02-01 RX ORDER — SENNA PLUS 8.6 MG/1
2 TABLET ORAL AT BEDTIME
Refills: 0 | Status: DISCONTINUED | OUTPATIENT
Start: 2021-02-01 | End: 2021-02-03

## 2021-02-01 RX ADMIN — LIDOCAINE 3 PATCH: 4 CREAM TOPICAL at 20:30

## 2021-02-01 RX ADMIN — OXYCODONE HYDROCHLORIDE 5 MILLIGRAM(S): 5 TABLET ORAL at 05:55

## 2021-02-01 RX ADMIN — POLYETHYLENE GLYCOL 3350 17 GRAM(S): 17 POWDER, FOR SOLUTION ORAL at 16:47

## 2021-02-01 RX ADMIN — SENNA PLUS 2 TABLET(S): 8.6 TABLET ORAL at 22:07

## 2021-02-01 RX ADMIN — SODIUM CHLORIDE 3 MILLILITER(S): 9 INJECTION INTRAMUSCULAR; INTRAVENOUS; SUBCUTANEOUS at 05:48

## 2021-02-01 RX ADMIN — Medication 100 MILLIGRAM(S): at 11:48

## 2021-02-01 RX ADMIN — INSULIN GLARGINE 10 UNIT(S): 100 INJECTION, SOLUTION SUBCUTANEOUS at 22:05

## 2021-02-01 RX ADMIN — Medication 10 MILLIGRAM(S): at 05:47

## 2021-02-01 RX ADMIN — OXYCODONE HYDROCHLORIDE 5 MILLIGRAM(S): 5 TABLET ORAL at 15:32

## 2021-02-01 RX ADMIN — ENOXAPARIN SODIUM 40 MILLIGRAM(S): 100 INJECTION SUBCUTANEOUS at 11:47

## 2021-02-01 RX ADMIN — SODIUM CHLORIDE 3 MILLILITER(S): 9 INJECTION INTRAMUSCULAR; INTRAVENOUS; SUBCUTANEOUS at 12:56

## 2021-02-01 RX ADMIN — METFORMIN HYDROCHLORIDE 1000 MILLIGRAM(S): 850 TABLET ORAL at 05:48

## 2021-02-01 RX ADMIN — OXYCODONE HYDROCHLORIDE 5 MILLIGRAM(S): 5 TABLET ORAL at 22:40

## 2021-02-01 RX ADMIN — Medication 0.6 MILLIGRAM(S): at 05:48

## 2021-02-01 RX ADMIN — Medication 3: at 16:28

## 2021-02-01 RX ADMIN — SODIUM CHLORIDE 3 MILLILITER(S): 9 INJECTION INTRAMUSCULAR; INTRAVENOUS; SUBCUTANEOUS at 20:30

## 2021-02-01 RX ADMIN — PANTOPRAZOLE SODIUM 40 MILLIGRAM(S): 20 TABLET, DELAYED RELEASE ORAL at 05:47

## 2021-02-01 RX ADMIN — LIDOCAINE 3 PATCH: 4 CREAM TOPICAL at 12:10

## 2021-02-01 RX ADMIN — Medication 0.6 MILLIGRAM(S): at 16:29

## 2021-02-01 RX ADMIN — Medication 40 MILLIGRAM(S): at 05:47

## 2021-02-01 RX ADMIN — Medication 1: at 22:06

## 2021-02-01 RX ADMIN — Medication 1000 MILLIGRAM(S): at 00:46

## 2021-02-01 NOTE — PROGRESS NOTE ADULT - PROBLEM SELECTOR PLAN 1
CT noted above  Synovial fluid culture NGTD  Pt takes allopurinol and colchicine at home  Continue with home regimen  Continue with steroids  Pain regimen per pain mgmt recommendations  PT recommends HPT with rolling walker  Ortho following  Pain Mgmt NP following

## 2021-02-01 NOTE — PROGRESS NOTE ADULT - ASSESSMENT
Confidential Drug Utilization Report  Search Terms: [place], 1970   Search Date: 01/28/2021 14:55:30 PM   The Drug Utilization Report below displays all of the controlled substance prescriptions, if any, that your patient has filled in the last twelve months. The information displayed on this report is compiled from pharmacy submissions to the Department, and accurately reflects the information as submitted by the pharmacies.  This report was requested by: India Vicente | Reference #: [id number]   There are no results for the search terms that you entered.

## 2021-02-01 NOTE — PROGRESS NOTE ADULT - PROBLEM SELECTOR PLAN 1
Joint pain. Pt with generalized joint pain greatest over right knee somatic in nature due to pseudogout.  s/p arthrocentesis   LFTs elevated - us liver, avoid hepatotoxic agents  Opioid pain recommendations   - Continue oxycodone 5mg po q 6 hours severe prn    Non-opioid pain recommendations   - dc tylenol  - dc nsaids and start steroids - prednisone 40mg po daily for 5 days (monitor bs)  - Lidoderm 5% patch 3 patches daily.   - Continue allopurinol 100mg PO daily.  - Continue colchicine 0.6 PO 2x/day.   Bowel Regimen  -Per primary team, pt with diarrhea 1/27  Mild pain   - Non-pharmacological pain treatment recommendations  - Warm/ Cool packs PRN   - Repositioning, elevation, imagery, relaxation, distraction.  - Physical therapy OOB if no contraindications   Nutrition consult  Recommendations discussed with primary team and RN. Joint pain. Pt with generalized joint pain greatest over right knee somatic in nature due to pseudogout.  s/p arthrocentesis.    LFTs elevated - us liver, avoid hepatotoxic agents  Opioid pain recommendations   - Continue oxycodone 5mg po q 6 hours severe prn    Non-opioid pain recommendations   - dc tylenol  - dc nsaids and start steroids - prednisone 40mg po daily for 5 days (monitor bs)BS elevated.  Lantus started - pt can follow up with dr. guallpa  - Lidoderm 5% patch 3 patches daily.   - Continue allopurinol 100mg PO daily.  - Continue colchicine 0.6 PO 2x/day.   Bowel Regimen  -Per primary team, pt with diarrhea 1/27  Mild pain   - Non-pharmacological pain treatment recommendations  - Warm/ Cool packs PRN   - Repositioning, elevation, imagery, relaxation, distraction.  - Physical therapy OOB if no contraindications   Nutrition consult  Recommendations discussed with primary team and RN.

## 2021-02-01 NOTE — PROGRESS NOTE ADULT - PROBLEM SELECTOR PLAN 7
Controlled  Pt takes Lisinopril at home  ACE held secondary to RUTH  Restart home regimen when medically appropriate

## 2021-02-02 LAB
ANION GAP SERPL CALC-SCNC: 6 MMOL/L — SIGNIFICANT CHANGE UP (ref 5–17)
BUN SERPL-MCNC: 33 MG/DL — HIGH (ref 7–18)
CALCIUM SERPL-MCNC: 9.4 MG/DL — SIGNIFICANT CHANGE UP (ref 8.4–10.5)
CHLORIDE SERPL-SCNC: 101 MMOL/L — SIGNIFICANT CHANGE UP (ref 96–108)
CO2 SERPL-SCNC: 30 MMOL/L — SIGNIFICANT CHANGE UP (ref 22–31)
CREAT SERPL-MCNC: 1.48 MG/DL — HIGH (ref 0.5–1.3)
CULTURE RESULTS: SIGNIFICANT CHANGE UP
CULTURE RESULTS: SIGNIFICANT CHANGE UP
GLUCOSE BLDC GLUCOMTR-MCNC: 177 MG/DL — HIGH (ref 70–99)
GLUCOSE BLDC GLUCOMTR-MCNC: 333 MG/DL — HIGH (ref 70–99)
GLUCOSE BLDC GLUCOMTR-MCNC: 336 MG/DL — HIGH (ref 70–99)
GLUCOSE BLDC GLUCOMTR-MCNC: 343 MG/DL — HIGH (ref 70–99)
GLUCOSE SERPL-MCNC: 183 MG/DL — HIGH (ref 70–99)
HCT VFR BLD CALC: 44.2 % — SIGNIFICANT CHANGE UP (ref 39–50)
HGB BLD-MCNC: 14.4 G/DL — SIGNIFICANT CHANGE UP (ref 13–17)
MAGNESIUM SERPL-MCNC: 2.2 MG/DL — SIGNIFICANT CHANGE UP (ref 1.6–2.6)
MCHC RBC-ENTMCNC: 28.2 PG — SIGNIFICANT CHANGE UP (ref 27–34)
MCHC RBC-ENTMCNC: 32.6 GM/DL — SIGNIFICANT CHANGE UP (ref 32–36)
MCV RBC AUTO: 86.7 FL — SIGNIFICANT CHANGE UP (ref 80–100)
NRBC # BLD: 0 /100 WBCS — SIGNIFICANT CHANGE UP (ref 0–0)
PHOSPHATE SERPL-MCNC: 2.9 MG/DL — SIGNIFICANT CHANGE UP (ref 2.5–4.5)
PLATELET # BLD AUTO: 345 K/UL — SIGNIFICANT CHANGE UP (ref 150–400)
POTASSIUM SERPL-MCNC: 4.2 MMOL/L — SIGNIFICANT CHANGE UP (ref 3.5–5.3)
POTASSIUM SERPL-SCNC: 4.2 MMOL/L — SIGNIFICANT CHANGE UP (ref 3.5–5.3)
RBC # BLD: 5.1 M/UL — SIGNIFICANT CHANGE UP (ref 4.2–5.8)
RBC # FLD: 12.3 % — SIGNIFICANT CHANGE UP (ref 10.3–14.5)
SARS-COV-2 RNA SPEC QL NAA+PROBE: SIGNIFICANT CHANGE UP
SODIUM SERPL-SCNC: 137 MMOL/L — SIGNIFICANT CHANGE UP (ref 135–145)
SPECIMEN SOURCE: SIGNIFICANT CHANGE UP
SPECIMEN SOURCE: SIGNIFICANT CHANGE UP
WBC # BLD: 11.09 K/UL — HIGH (ref 3.8–10.5)
WBC # FLD AUTO: 11.09 K/UL — HIGH (ref 3.8–10.5)

## 2021-02-02 PROCEDURE — 99232 SBSQ HOSP IP/OBS MODERATE 35: CPT

## 2021-02-02 PROCEDURE — 99231 SBSQ HOSP IP/OBS SF/LOW 25: CPT

## 2021-02-02 RX ORDER — SODIUM CHLORIDE 9 MG/ML
1000 INJECTION, SOLUTION INTRAVENOUS ONCE
Refills: 0 | Status: COMPLETED | OUTPATIENT
Start: 2021-02-02 | End: 2021-02-02

## 2021-02-02 RX ORDER — INSULIN LISPRO 100/ML
2 VIAL (ML) SUBCUTANEOUS
Refills: 0 | Status: DISCONTINUED | OUTPATIENT
Start: 2021-02-02 | End: 2021-02-03

## 2021-02-02 RX ADMIN — Medication 1: at 08:35

## 2021-02-02 RX ADMIN — ENOXAPARIN SODIUM 40 MILLIGRAM(S): 100 INJECTION SUBCUTANEOUS at 11:34

## 2021-02-02 RX ADMIN — Medication 4: at 21:45

## 2021-02-02 RX ADMIN — LIDOCAINE 3 PATCH: 4 CREAM TOPICAL at 21:59

## 2021-02-02 RX ADMIN — Medication 100 MILLIGRAM(S): at 11:34

## 2021-02-02 RX ADMIN — PANTOPRAZOLE SODIUM 40 MILLIGRAM(S): 20 TABLET, DELAYED RELEASE ORAL at 06:21

## 2021-02-02 RX ADMIN — Medication 40 MILLIGRAM(S): at 06:21

## 2021-02-02 RX ADMIN — Medication 2 UNIT(S): at 16:53

## 2021-02-02 RX ADMIN — SODIUM CHLORIDE 3 MILLILITER(S): 9 INJECTION INTRAMUSCULAR; INTRAVENOUS; SUBCUTANEOUS at 05:17

## 2021-02-02 RX ADMIN — SODIUM CHLORIDE 1000 MILLILITER(S): 9 INJECTION, SOLUTION INTRAVENOUS at 17:48

## 2021-02-02 RX ADMIN — Medication 4: at 16:53

## 2021-02-02 RX ADMIN — LIDOCAINE 3 PATCH: 4 CREAM TOPICAL at 19:53

## 2021-02-02 RX ADMIN — Medication 4: at 11:34

## 2021-02-02 RX ADMIN — INSULIN GLARGINE 10 UNIT(S): 100 INJECTION, SOLUTION SUBCUTANEOUS at 21:45

## 2021-02-02 RX ADMIN — SENNA PLUS 2 TABLET(S): 8.6 TABLET ORAL at 21:44

## 2021-02-02 RX ADMIN — LIDOCAINE 3 PATCH: 4 CREAM TOPICAL at 11:36

## 2021-02-02 RX ADMIN — SODIUM CHLORIDE 3 MILLILITER(S): 9 INJECTION INTRAMUSCULAR; INTRAVENOUS; SUBCUTANEOUS at 21:59

## 2021-02-02 RX ADMIN — SODIUM CHLORIDE 3 MILLILITER(S): 9 INJECTION INTRAMUSCULAR; INTRAVENOUS; SUBCUTANEOUS at 13:01

## 2021-02-02 RX ADMIN — LIDOCAINE 3 PATCH: 4 CREAM TOPICAL at 00:15

## 2021-02-02 NOTE — DIETITIAN INITIAL EVALUATION ADULT. - PERTINENT LABORATORY DATA
02-02 Na137 mmol/L Glu 183 mg/dL<H> K+ 4.2 mmol/L Cr  1.48 mg/dL<H> BUN 33 mg/dL<H> 02-02 Phos 2.9 mg/dL 02-01 Alb 3.6 g/dL 01-28 Chol 87 mg/dL LDL --    HDL 37 mg/dL<L> Trig 135 mg/dL

## 2021-02-02 NOTE — PROGRESS NOTE ADULT - ASSESSMENT
Confidential Drug Utilization Report  Search Terms: [place], 1970   Search Date: 01/28/2021 14:55:30 PM   The Drug Utilization Report below displays all of the controlled substance prescriptions, if any, that your patient has filled in the last twelve months. The information displayed on this report is compiled from pharmacy submissions to the Department, and accurately reflects the information as submitted by the pharmacies.  This report was requested by: India Vicente | Reference #: [id number]   There are no results for the search terms that you entered.

## 2021-02-02 NOTE — PROGRESS NOTE ADULT - PROBLEM SELECTOR PLAN 1
Joint pain. Pt with generalized joint pain greatest over right knee somatic in nature due to pseudogout.  s/p arthrocentesis.    LFTs elevated - us liver, avoid hepatotoxic agents. Roshan lfts  Can discharge home on lidocaine patches and oxycodone prn.    Opioid pain recommendations   - Continue oxycodone 5mg po q 6 hours severe prn    Non-opioid pain recommendations   - dc tylenol  - dc nsaids and start steroids - prednisone 40mg po daily for 5 days (monitor bs)BS elevated.  Lantus started - pt can follow up with dr. guallpa  - Lidoderm 5% patch 3 patches daily.   - Continue allopurinol 100mg PO daily.  - Continue colchicine 0.6 PO 2x/day.   Bowel Regimen  -Per primary team, pt with diarrhea 1/27  Mild pain   - Non-pharmacological pain treatment recommendations  - Warm/ Cool packs PRN   - Repositioning, elevation, imagery, relaxation, distraction.  - Physical therapy OOB if no contraindications   Nutrition consult  Recommendations discussed with primary team and RN.

## 2021-02-02 NOTE — PROGRESS NOTE ADULT - ATTENDING COMMENTS
49 y/o male h/o gout, DM, HTN and psg hx of right knee sxg, appendectomy and cholecystectomy who presented with acute on chronic pain in multiple joints but specifically right knee.       Problem/Plan - 1:  ·  Pseudogout: Prednisone 40mg PO daily  - Colchicine 0.6mg PO BID till symptoms resolve  - PRN oxycodone  -pain mgmt following    -Ortho following   -PT consult.   -Avoid NSAIDS because of renal dysfunction     Problem/Plan - 3:  -Problem: DM (diabetes mellitus).  Plan: -on glipizide and metformin at home, I resumed both meds   -cont HSSC   -a1c 8.0.   -now uncontrolled, likely due to steroid use and home meds were on hold, might need short term insulin     Problem/Plan - 4:  ·  Problem: RUTH (acute kidney injury).  Plan: -improving  -avoid nephrotoxins   -mon renal function.      Problem/Plan - 5:  ·  Problem: HTN (hypertension).  Plan: -Will resume lisinopril as long term befit is more than mild decline in renal function       Problem/Plan - 6:  Problem: Prophylactic measure. Plan: -dvt ppx- cont Lovenox, limited mobility due to pain  -gi ppx- cont PPI.        Dispo: Patient still unable to bend the knee, feels better though and needs ongoing care  Will resume lisinopril, metformin and glipizide and monitor renal function
49 y/o male h/o gout, DM, HTN and psg hx of right knee sxg, appendectomy and cholecystectomy who presented with acute on chronic pain in multiple joints but specifically right knee.       Problem/Plan - 1:  ·  Pseudogout: Prednisone 40mg PO daily  - Colchicine 0.6mg PO BID till symptoms resolve  - PRN oxycodone  -pain mgmt following    -Ortho following   -PT recommends rolling and home PT  -Avoid NSAIDS because of renal dysfunction     Problem/Plan - 3:  -Problem: DM (diabetes mellitus).  Plan: -on glipizide and metformin at home, I resumed both meds   -cont HSSC, Will start 10 units of lantus while patient is on prednisone  -a1c 8.0.   -now uncontrolled, likely due to steroid use and home meds were on hold, might need short term insulin     Problem/Plan - 4:  -Problem: RUTH (acute kidney injury).  Plan: -resumed on ACE with mild dysfunction will continue ACE, repeat labs today for hyperkalemia and renal function  -avoid nephrotoxins   -mon renal function.      Problem/Plan - 5:  ·  Problem: HTN (hypertension).  Plan: -Will resume lisinopril as long term befit is more than mild decline in renal function  monitor renal function     Problem/Plan - 6:  Problem: Prophylactic measure. Plan: -dvt ppx- cont Lovenox, limited mobility due to pain  -gi ppx- cont PPI.        Dispo: Plan was for discharge but patient's glucose is till uncontrolled and he is concerned about it, he has mild renal dysfunction likely due to lisinopril and he has liver dysfunction unclear why, will obtain abdominal sonogram and hold discharge for today will also hold statin  Will resume lisinopril, metformin and glipizide and monitor renal function.
49 y/o male h/o gout, DM, HTN and psg hx of right knee sxg, appendectomy and cholecystectomy  presenting with acute on chronic pain in multiple joints. Noted to have significant pain, tenderness, swelling of right knee. UA not elevated. Ortho consulted, Right knee was Synovial fluid showed CPPD crystals, cell count< 5000, septic arthritis ruled out. Started on prednisone along with colchicine.     Pseudogout  h/o Gout  Type DM on po hypoglycemics  HTN    - septic arthritis ruled out, started on Prednisone 40 mg. c/w colchicine  - Pain management on board  - On SSI  - LIsinopril held 2/2 RUTH  - PT recommended home with home PT  - Discharge planning: if patient shows clinical response to steriods, possible d/c in am with tapered course of steroids for pseudogout. Patient reports having prescription of Colchicine for gout.
I have personally seen, examined, and participated in the care of this patient this morning. I have reviewed all pertinent clinical information, including history, physical exam, plan and medical student/resident/PA/NP note, and agree, with my independent findings, alterations, and conclusions as noted:     Reviewed vitals, labs, imaging personally: WBC mildly elevated, FSBG brittle, creatinine improving but 1.32 still, transaminitis, uric acid high  On lovenox, lantus 10, prednisone, allopurinol, colchicine, protonix    Gen: NAD obese male  HEENT: normocephalic, EOMI, PERRLA, neck supple, no thyromegaly  Chest/CV: RRR, +2 peripheral pulses  Pulm: CTAB/L  Abd/GI: soft, NT, ND +BS  MSK: rt knee tenderness to palpation, no erythema, limited active range of motion  Heme/lymph: no cervical lymphadenopathy  Skin: no rashes  Neuro: CN 2-12 grossly intact  Psych: alert and oriented, normal mood, affect     Spoke with pt. Pain stable, but feeling weak. Denies CP/SOB, F/C, dysuria.    A/P: 51 y/o male h/o gout, DM, HTN and psg hx of right knee sxg, appendectomy and cholecystectomy who presented with acute on chronic pain in multiple joints but specifically right knee.    #Pseudogout- improved  -c/w colchicine, prednisone, allopurinol    #DM  -HISS, lantus 10    #RUTH  -given RUTH increased yesterday, holding ACEI    #HTN  -monitor off lisinopril for now given RUTH    #Transaminitis: unclear reason  -US abdomen  -consider hepatitis panel    #DVT PPX  -lovenox    Rachell Lomeli MD  Division of Hospital Medicine    Plan d/w CHEN Carbone
I have personally seen, examined, and participated in the care of this patient this morning. I have reviewed all pertinent clinical information, including history, physical exam, plan and medical student/resident/PA/NP note, and agree, with my independent findings, alterations, and conclusions as noted:     Reviewed vitals, labs, imaging personally: WBC mildly elevated, FSBG high  On lovenox, lantus 10, prednisone, allopurinol, colchicine, protonix    Gen: NAD obese male sitting up on bed talking on phone  HEENT: normocephalic, EOMI, PERRLA, neck supple, no thyromegaly  Chest/CV: RRR, +2 peripheral pulses  Pulm: CTAB/L  Abd/GI: soft, NT, ND +BS  MSK: rt knee tenderness to palpation, no erythema, limited active range of motion  Heme/lymph: no cervical lymphadenopathy  Skin: no rashes  Neuro: CN 2-12 grossly intact  Psych: alert and oriented, normal mood, affect     Spoke with pt. Pain stable. Denies CP, dizziness, polyuria, polydipsia.    A/P: 51 y/o male h/o gout, DM, HTN and psg hx of right knee sxg, appendectomy and cholecystectomy who presented with acute on chronic pain in multiple joints but specifically right knee.    #Pseudogout- improved  -c/w colchicine, prednisone, allopurinol    #DM: FSBG high today  -HISS, lantus 10  -will add premeal insulin - likely exacerbated from steroid use    #RUTH  -hold ACEI for now  -may be prerenal - will bolus fluids. If no imrpovement, send urine prot/creat  -no evidence to suggest colchicine toxicity now, given no leukopenia, no neuropathy, but will closely monitor  -avoid nephrotoxins, trend BMP     #HTN  -monitor off lisinopril for now given RUTH    #Transaminitis: likely NAFLD  -will need outpt f/u with PMD    #DVT PPX  -lovenox    Rachell Lomeli MD  Division of Hospital Medicine    Plan d/w CHEN Carbone

## 2021-02-02 NOTE — PROGRESS NOTE ADULT - PROBLEM SELECTOR PLAN 3
-on glipizide and metformin at home   -cont HSSC   -a1c 8.0
Pt on opiods   Start bowel regimen
Pt on opiods   Continue with bowel regimen

## 2021-02-02 NOTE — DIETITIAN INITIAL EVALUATION ADULT. - OTHER INFO
Patient from home. Visited pt. alert & in mild pain, reports po intake usually good, denies nausea/vomiting or diarrhea but complains of constipation PTA, stated  Lbs >2wks & intentional loss >20 Lbs q9mrjlxa ago & recently regained ~10 Lbs? Per pt. dx. 12 yrs ago with "diabetes", avoids certain carbohydrate foods, complaint with "diabetic meds", also monitors blood glucose twice daily at home & now concerned about dealing with "Gout". Provided nutrition education on My Plate Planner with carbohydrate counting & "Low Purine" nutrition therapy, reviewed & reinforced information provided, pt. receptive & encouraged to f/up with PCP/Endocrinologist once discharged. Presently pt. consuming >50% of meals & tolerating, followed by pain management, d/w NP, skin intact, no edema.

## 2021-02-02 NOTE — PROGRESS NOTE ADULT - PROBLEM SELECTOR PLAN 2
-CT knee as above   -rest plan as above
CT noted above   Plan as above
CT noted above   Plan as above

## 2021-02-02 NOTE — DIETITIAN INITIAL EVALUATION ADULT. - FACTORS AFF FOOD INTAKE
weakness, arthralgia, joint pain, Gout, type 2 diabetic/pain/persistent constipation/other (specify)

## 2021-02-02 NOTE — DIETITIAN INITIAL EVALUATION ADULT. - PROBLEM SELECTOR PLAN 3
Pt pw cr 1.5   unknown bs but pt doesn't recall problems with kidneys in the past   will send urine lytes  will start on gentle hydration fu am bmp

## 2021-02-02 NOTE — DIETITIAN INITIAL EVALUATION ADULT. - PERTINENT MEDS FT
MEDICATIONS  (STANDING):  allopurinol 100 milliGRAM(s) Oral daily  enoxaparin Injectable 40 milliGRAM(s) SubCutaneous daily  insulin glargine Injectable (LANTUS) 10 Unit(s) SubCutaneous at bedtime  insulin lispro (ADMELOG) corrective regimen sliding scale   SubCutaneous Before meals and at bedtime  lidocaine   Patch 3 Patch Transdermal daily  pantoprazole    Tablet 40 milliGRAM(s) Oral before breakfast  predniSONE   Tablet 40 milliGRAM(s) Oral daily  senna 2 Tablet(s) Oral at bedtime  sodium chloride 0.9% lock flush 3 milliLiter(s) IV Push every 8 hours    MEDICATIONS  (PRN):  oxyCODONE    IR 5 milliGRAM(s) Oral every 4 hours PRN Severe Pain (7 - 10)  polyethylene glycol 3350 17 Gram(s) Oral daily PRN Constipation

## 2021-02-02 NOTE — PROGRESS NOTE ADULT - PROBLEM SELECTOR PLAN 6
Improving  SCr 1.32  Avoid Nephrotoxic Meds/ Agents like (NSAIDs, IV contrast, Aminoglycosides such as gentamicin, -Gadolinium contrast, Phosphate containing enemas, etc.)   Follow up BMP in AM
SCr 1.48  Encourage PO hydration  Avoid Nephrotoxic Meds/ Agents like (NSAIDs, IV contrast, Aminoglycosides such as gentamicin, -Gadolinium contrast, Phosphate containing enemas, etc.)   Follow up BMP in AM
-dvt ppx- cont Lovenox, limited mobility due to pain  -gi ppx- cont PPI

## 2021-02-02 NOTE — PROGRESS NOTE ADULT - PROBLEM SELECTOR PLAN 9
Pt admitted from home  PT recommends HPT with rolling walker, script provided in chart  Care management following for safe discharge planning
Pt admitted from home  PT recommends HPT with rolling walker  Care management following for safe discharge planning

## 2021-02-03 VITALS
SYSTOLIC BLOOD PRESSURE: 132 MMHG | HEART RATE: 87 BPM | RESPIRATION RATE: 18 BRPM | TEMPERATURE: 98 F | DIASTOLIC BLOOD PRESSURE: 83 MMHG | OXYGEN SATURATION: 94 %

## 2021-02-03 LAB
ANION GAP SERPL CALC-SCNC: 8 MMOL/L — SIGNIFICANT CHANGE UP (ref 5–17)
BUN SERPL-MCNC: 26 MG/DL — HIGH (ref 7–18)
CALCIUM SERPL-MCNC: 9.7 MG/DL — SIGNIFICANT CHANGE UP (ref 8.4–10.5)
CHLORIDE SERPL-SCNC: 100 MMOL/L — SIGNIFICANT CHANGE UP (ref 96–108)
CO2 SERPL-SCNC: 30 MMOL/L — SIGNIFICANT CHANGE UP (ref 22–31)
CREAT SERPL-MCNC: 1.33 MG/DL — HIGH (ref 0.5–1.3)
GLUCOSE BLDC GLUCOMTR-MCNC: 192 MG/DL — HIGH (ref 70–99)
GLUCOSE BLDC GLUCOMTR-MCNC: 321 MG/DL — HIGH (ref 70–99)
GLUCOSE BLDC GLUCOMTR-MCNC: 342 MG/DL — HIGH (ref 70–99)
GLUCOSE BLDC GLUCOMTR-MCNC: 460 MG/DL — CRITICAL HIGH (ref 70–99)
GLUCOSE SERPL-MCNC: 167 MG/DL — HIGH (ref 70–99)
HCT VFR BLD CALC: 41.5 % — SIGNIFICANT CHANGE UP (ref 39–50)
HGB BLD-MCNC: 13.9 G/DL — SIGNIFICANT CHANGE UP (ref 13–17)
MAGNESIUM SERPL-MCNC: 2.4 MG/DL — SIGNIFICANT CHANGE UP (ref 1.6–2.6)
MCHC RBC-ENTMCNC: 28.4 PG — SIGNIFICANT CHANGE UP (ref 27–34)
MCHC RBC-ENTMCNC: 33.5 GM/DL — SIGNIFICANT CHANGE UP (ref 32–36)
MCV RBC AUTO: 84.9 FL — SIGNIFICANT CHANGE UP (ref 80–100)
NRBC # BLD: 0 /100 WBCS — SIGNIFICANT CHANGE UP (ref 0–0)
PHOSPHATE SERPL-MCNC: 3.4 MG/DL — SIGNIFICANT CHANGE UP (ref 2.5–4.5)
PLATELET # BLD AUTO: 309 K/UL — SIGNIFICANT CHANGE UP (ref 150–400)
POTASSIUM SERPL-MCNC: 3.9 MMOL/L — SIGNIFICANT CHANGE UP (ref 3.5–5.3)
POTASSIUM SERPL-SCNC: 3.9 MMOL/L — SIGNIFICANT CHANGE UP (ref 3.5–5.3)
RBC # BLD: 4.89 M/UL — SIGNIFICANT CHANGE UP (ref 4.2–5.8)
RBC # FLD: 12.2 % — SIGNIFICANT CHANGE UP (ref 10.3–14.5)
SODIUM SERPL-SCNC: 138 MMOL/L — SIGNIFICANT CHANGE UP (ref 135–145)
WBC # BLD: 13.61 K/UL — HIGH (ref 3.8–10.5)
WBC # FLD AUTO: 13.61 K/UL — HIGH (ref 3.8–10.5)

## 2021-02-03 PROCEDURE — 85730 THROMBOPLASTIN TIME PARTIAL: CPT

## 2021-02-03 PROCEDURE — U0003: CPT

## 2021-02-03 PROCEDURE — 85652 RBC SED RATE AUTOMATED: CPT

## 2021-02-03 PROCEDURE — 87635 SARS-COV-2 COVID-19 AMP PRB: CPT

## 2021-02-03 PROCEDURE — 89051 BODY FLUID CELL COUNT: CPT

## 2021-02-03 PROCEDURE — 85025 COMPLETE CBC W/AUTO DIFF WBC: CPT

## 2021-02-03 PROCEDURE — 99238 HOSP IP/OBS DSCHRG MGMT 30/<: CPT

## 2021-02-03 PROCEDURE — 83036 HEMOGLOBIN GLYCOSYLATED A1C: CPT

## 2021-02-03 PROCEDURE — U0005: CPT

## 2021-02-03 PROCEDURE — 83540 ASSAY OF IRON: CPT

## 2021-02-03 PROCEDURE — 85027 COMPLETE CBC AUTOMATED: CPT

## 2021-02-03 PROCEDURE — 86769 SARS-COV-2 COVID-19 ANTIBODY: CPT

## 2021-02-03 PROCEDURE — 82607 VITAMIN B-12: CPT

## 2021-02-03 PROCEDURE — 80061 LIPID PANEL: CPT

## 2021-02-03 PROCEDURE — 87040 BLOOD CULTURE FOR BACTERIA: CPT

## 2021-02-03 PROCEDURE — 84560 ASSAY OF URINE/URIC ACID: CPT

## 2021-02-03 PROCEDURE — 84300 ASSAY OF URINE SODIUM: CPT

## 2021-02-03 PROCEDURE — 89060 EXAM SYNOVIAL FLUID CRYSTALS: CPT

## 2021-02-03 PROCEDURE — 84100 ASSAY OF PHOSPHORUS: CPT

## 2021-02-03 PROCEDURE — 82962 GLUCOSE BLOOD TEST: CPT

## 2021-02-03 PROCEDURE — 99285 EMERGENCY DEPT VISIT HI MDM: CPT

## 2021-02-03 PROCEDURE — 76376 3D RENDER W/INTRP POSTPROCES: CPT

## 2021-02-03 PROCEDURE — 84443 ASSAY THYROID STIM HORMONE: CPT

## 2021-02-03 PROCEDURE — 82746 ASSAY OF FOLIC ACID SERUM: CPT

## 2021-02-03 PROCEDURE — 82550 ASSAY OF CK (CPK): CPT

## 2021-02-03 PROCEDURE — 76775 US EXAM ABDO BACK WALL LIM: CPT

## 2021-02-03 PROCEDURE — 82728 ASSAY OF FERRITIN: CPT

## 2021-02-03 PROCEDURE — 82570 ASSAY OF URINE CREATININE: CPT

## 2021-02-03 PROCEDURE — 87205 SMEAR GRAM STAIN: CPT

## 2021-02-03 PROCEDURE — 83935 ASSAY OF URINE OSMOLALITY: CPT

## 2021-02-03 PROCEDURE — 87075 CULTR BACTERIA EXCEPT BLOOD: CPT

## 2021-02-03 PROCEDURE — 83550 IRON BINDING TEST: CPT

## 2021-02-03 PROCEDURE — 73562 X-RAY EXAM OF KNEE 3: CPT

## 2021-02-03 PROCEDURE — 76700 US EXAM ABDOM COMPLETE: CPT

## 2021-02-03 PROCEDURE — 84550 ASSAY OF BLOOD/URIC ACID: CPT

## 2021-02-03 PROCEDURE — 80053 COMPREHEN METABOLIC PANEL: CPT

## 2021-02-03 PROCEDURE — 80048 BASIC METABOLIC PNL TOTAL CA: CPT

## 2021-02-03 PROCEDURE — 96360 HYDRATION IV INFUSION INIT: CPT

## 2021-02-03 PROCEDURE — 36415 COLL VENOUS BLD VENIPUNCTURE: CPT

## 2021-02-03 PROCEDURE — 83735 ASSAY OF MAGNESIUM: CPT

## 2021-02-03 PROCEDURE — 96372 THER/PROPH/DIAG INJ SC/IM: CPT | Mod: XU

## 2021-02-03 PROCEDURE — 84145 PROCALCITONIN (PCT): CPT

## 2021-02-03 PROCEDURE — 87070 CULTURE OTHR SPECIMN AEROBIC: CPT

## 2021-02-03 PROCEDURE — 85610 PROTHROMBIN TIME: CPT

## 2021-02-03 PROCEDURE — 73700 CT LOWER EXTREMITY W/O DYE: CPT

## 2021-02-03 RX ORDER — LIDOCAINE 4 G/100G
1 CREAM TOPICAL
Qty: 7 | Refills: 0
Start: 2021-02-03 | End: 2021-02-09

## 2021-02-03 RX ADMIN — Medication 1: at 08:05

## 2021-02-03 RX ADMIN — OXYCODONE HYDROCHLORIDE 5 MILLIGRAM(S): 5 TABLET ORAL at 06:26

## 2021-02-03 RX ADMIN — PANTOPRAZOLE SODIUM 40 MILLIGRAM(S): 20 TABLET, DELAYED RELEASE ORAL at 04:45

## 2021-02-03 RX ADMIN — Medication 2 UNIT(S): at 12:37

## 2021-02-03 RX ADMIN — ENOXAPARIN SODIUM 40 MILLIGRAM(S): 100 INJECTION SUBCUTANEOUS at 10:18

## 2021-02-03 RX ADMIN — SODIUM CHLORIDE 3 MILLILITER(S): 9 INJECTION INTRAMUSCULAR; INTRAVENOUS; SUBCUTANEOUS at 04:44

## 2021-02-03 RX ADMIN — Medication 100 MILLIGRAM(S): at 10:17

## 2021-02-03 RX ADMIN — Medication 2 UNIT(S): at 08:05

## 2021-02-03 RX ADMIN — Medication 4: at 12:37

## 2021-02-03 RX ADMIN — Medication 40 MILLIGRAM(S): at 04:44

## 2021-02-03 RX ADMIN — OXYCODONE HYDROCHLORIDE 5 MILLIGRAM(S): 5 TABLET ORAL at 14:06

## 2021-02-03 RX ADMIN — LIDOCAINE 3 PATCH: 4 CREAM TOPICAL at 10:19

## 2021-02-03 NOTE — PROGRESS NOTE ADULT - PROBLEM SELECTOR PROBLEM 5
DM (diabetes mellitus)
DM (diabetes mellitus)
RUTH (acute kidney injury)
HTN (hypertension)
DM (diabetes mellitus)

## 2021-02-03 NOTE — PROGRESS NOTE ADULT - REASON FOR ADMISSION
joint pain

## 2021-02-03 NOTE — DISCHARGE NOTE NURSING/CASE MANAGEMENT/SOCIAL WORK - PATIENT PORTAL LINK FT
You can access the FollowMyHealth Patient Portal offered by [facility] by registering at the following website: [email or web address]. By joining [facility]’s FollowMyHealth portal, you will also be able to view your health information using other applications (apps) compatible with our system.

## 2021-02-03 NOTE — PROGRESS NOTE ADULT - PROBLEM SELECTOR PROBLEM 1
Gouty arthritis
Gouty arthritis
Joint pain
Gouty arthritis
Joint pain
Gouty arthritis

## 2021-02-03 NOTE — PROGRESS NOTE ADULT - PROBLEM SELECTOR PROBLEM 4
Insomnia
DM (diabetes mellitus)
Insomnia
RUTH (acute kidney injury)
Insomnia

## 2021-02-03 NOTE — PROGRESS NOTE ADULT - PROBLEM SELECTOR PROBLEM 6
RUTH (acute kidney injury)
Prophylactic measure
RUTH (acute kidney injury)
RUTH (acute kidney injury)

## 2021-02-03 NOTE — PROGRESS NOTE ADULT - SUBJECTIVE AND OBJECTIVE BOX
Source of information: , Chart review  Patient language: English  : n/a    CC:  right knee pain    This is a 50yMale patient who presents to the hospital for Patient is a 50y old  Male who presents with a chief complaint of joint pain (29 Jan 2021 15:10)    Pt with right knee pain. Pt with pseudo - gout.  Pt is better in knee. + increased flexion.  + tenderness on palpation. Pt is started on steroids to help with pain.      PAIN SCORE:  4/10       SCALE USED: (1-10 NRS)      PAST MEDICAL & SURGICAL HISTORY:  HTN (hypertension)    DM (diabetes mellitus)    Gout    S/P cholecystectomy    S/P appendectomy        FAMILY HISTORY:  FH: prostate cancer (Father)    FH: diabetes mellitus (Mother)    No pertinent family history          SOCIAL HISTORY:  [x ] Denies Smoking, Alcohol, or Drug Use    Allergies    No Known Drug Allergies  pollen, seasonal allergies (Sneezing)    Intolerances        MEDICATIONS:    MEDICATIONS  (STANDING):  allopurinol 100 milliGRAM(s) Oral daily  atorvastatin 40 milliGRAM(s) Oral at bedtime  colchicine 0.6 milliGRAM(s) Oral two times a day  enoxaparin Injectable 40 milliGRAM(s) SubCutaneous daily  insulin lispro (ADMELOG) corrective regimen sliding scale   SubCutaneous Before meals and at bedtime  lidocaine   Patch 3 Patch Transdermal daily  pantoprazole    Tablet 40 milliGRAM(s) Oral before breakfast  predniSONE   Tablet 40 milliGRAM(s) Oral daily  sodium chloride 0.9% lock flush 3 milliLiter(s) IV Push every 8 hours  sodium chloride 0.9%. 1000 milliLiter(s) (100 mL/Hr) IV Continuous <Continuous>    MEDICATIONS  (PRN):  acetaminophen   Tablet .. 1000 milliGRAM(s) Oral every 8 hours PRN Moderate Pain (4 - 6)  oxyCODONE    IR 5 milliGRAM(s) Oral every 4 hours PRN Severe Pain (7 - 10)      Vital Signs Last 24 Hrs  T(C): 36.5 (30 Jan 2021 05:41), Max: 36.5 (30 Jan 2021 05:41)  T(F): 97.7 (30 Jan 2021 05:41), Max: 97.7 (30 Jan 2021 05:41)  HR: 60 (30 Jan 2021 05:41) (60 - 69)  BP: 119/74 (30 Jan 2021 05:41) (119/74 - 142/89)  BP(mean): 106 (29 Jan 2021 13:50) (106 - 106)  RR: 18 (30 Jan 2021 05:41) (17 - 18)  SpO2: 96% (30 Jan 2021 05:41) (96% - 97%)    LABS:                          14.3   10.47 )-----------( 265      ( 30 Jan 2021 08:33 )             42.5     01-30    135  |  101  |  25<H>  ----------------------------<  222<H>  4.8   |  29  |  1.28    Ca    9.8      30 Jan 2021 08:33    TPro  8.4<H>  /  Alb  3.6  /  TBili  0.5  /  DBili  x   /  AST  25  /  ALT  40  /  AlkPhos  138<H>  01-30      LIVER FUNCTIONS - ( 30 Jan 2021 08:33 )  Alb: 3.6 g/dL / Pro: 8.4 g/dL / ALK PHOS: 138 U/L / ALT: 40 U/L DA / AST: 25 U/L / GGT: x             CAPILLARY BLOOD GLUCOSE      POCT Blood Glucose.: 351 mg/dL (30 Jan 2021 11:46)  POCT Blood Glucose.: 199 mg/dL (30 Jan 2021 07:38)  POCT Blood Glucose.: 328 mg/dL (29 Jan 2021 22:49)  POCT Blood Glucose.: 306 mg/dL (29 Jan 2021 21:14)  POCT Blood Glucose.: 172 mg/dL (29 Jan 2021 17:15)      Radiology:    Drug Screen:        REVIEW OF SYSTEMS:  CONSTITUTIONAL: No fever or fatigue  RESPIRATORY: No cough, wheezing, chills or hemoptysis; No shortness of breath  CARDIOVASCULAR: No chest pain, palpitations, dizziness, or leg swelling  GASTROINTESTINAL: No abdominal or epigastric pain. No nausea, vomiting; No diarrhea or constipation.   GENITOURINARY: No dysuria, frequency, hematuria, retention or incontinence  MUSCULOSKELETAL: + right knee pain and swelling  NEURO: No headaches, No numbness/tingling b/l LE, No weakness  PSYCHIATRIC: No depression, anxiety, mood swings, or difficulty sleeping    PHYSICAL EXAM:  GENERAL:  Alert & Oriented X3, NAD, Good concentration  CHEST/LUNG: Clear to auscultation bilaterally; No rales, rhonchi, wheezing, or rubs  HEART: Regular rate and rhythm; No murmurs, rubs, or gallops  ABDOMEN: Soft, Nontender, Nondistended; Bowel sounds present  EXTREMITIES:  2+ Peripheral Pulses, No cyanosis, or edema  MUSCULOSKELETAL: + decreased rom + right knee tenderness + edema  SKIN: No rashes or lesions    Risk factors associated with adverse outcomes related to opioid treatment  [ ]  Concurrent benzodiazepine use  [ ]  History/ Active substance use or alcohol use disorder  [ ] Psychiatric co-morbidity  [ ] Sleep apnea  [ ] COPD  [ ] BMI> 35  [ ] Liver dysfunction  [ ] Renal dysfunction  [ ] CHF  [ ] Smoker  [ ]  Age > 60 years    [x ]  [place]  Reviewed and Copied to Chart. See below.    Plan of care and goal oriented pain management treatment options were discussed with patient and /or primary care giver; all questions and concerns were addressed and care was aligned with patient's wishes.    Educated patient on goal oriented pain management treatment options     01-30-21 @ 13:33
49 y/o male h/o gout, DM, HTN and psg hx of right knee sxg, appendectomy and cholecystectomy who presented with acute on chronic pain in multiple joints but specifically right knee.
50 yr old patient with joint pain and pseudogout
Source of information: , Chart review  Patient language: English  : n/a    CC:  right knee pain    This is a 50yMale patient who presents to the hospital for Patient is a 50y old  Male who presents with a chief complaint of joint pain (02 Feb 2021 13:33)    Pt with pseudogout of right knee.  Right knee pain significantly better.  Pt is oob and ambulating around unit.  Pt was on steroids for pain.  Blood sugars elevated.  Pt seen by nutrition.  Will follow up with endocrinology as outpt.     PAIN SCORE:    5/10     SCALE USED: (1-10 NRS)      PAST MEDICAL & SURGICAL HISTORY:  HTN (hypertension)    DM (diabetes mellitus)    Gout    S/P cholecystectomy    S/P appendectomy        FAMILY HISTORY:  FH: prostate cancer (Father)    FH: diabetes mellitus (Mother)    No pertinent family history          SOCIAL HISTORY:  [ x] Denies Smoking, Alcohol, or Drug Use    Allergies    No Known Drug Allergies  pollen, seasonal allergies (Sneezing)    Intolerances        MEDICATIONS:    MEDICATIONS  (STANDING):  allopurinol 100 milliGRAM(s) Oral daily  enoxaparin Injectable 40 milliGRAM(s) SubCutaneous daily  insulin glargine Injectable (LANTUS) 10 Unit(s) SubCutaneous at bedtime  insulin lispro (ADMELOG) corrective regimen sliding scale   SubCutaneous Before meals and at bedtime  insulin lispro Injectable (ADMELOG) 2 Unit(s) SubCutaneous three times a day before meals  lidocaine   Patch 3 Patch Transdermal daily  pantoprazole    Tablet 40 milliGRAM(s) Oral before breakfast  predniSONE   Tablet 40 milliGRAM(s) Oral daily  senna 2 Tablet(s) Oral at bedtime  sodium chloride 0.9% lock flush 3 milliLiter(s) IV Push every 8 hours    MEDICATIONS  (PRN):  oxyCODONE    IR 5 milliGRAM(s) Oral every 4 hours PRN Severe Pain (7 - 10)  polyethylene glycol 3350 17 Gram(s) Oral daily PRN Constipation      Vital Signs Last 24 Hrs  T(C): 36.6 (02 Feb 2021 13:30), Max: 36.6 (01 Feb 2021 20:11)  T(F): 97.8 (02 Feb 2021 13:30), Max: 97.8 (01 Feb 2021 20:11)  HR: 67 (02 Feb 2021 13:30) (57 - 67)  BP: 131/66 (02 Feb 2021 13:30) (115/69 - 131/66)  BP(mean): --  RR: 18 (02 Feb 2021 13:30) (16 - 18)  SpO2: 94% (02 Feb 2021 13:30) (94% - 100%)    LABS:                          14.4   11.09 )-----------( 345      ( 02 Feb 2021 08:02 )             44.2     02-02    137  |  101  |  33<H>  ----------------------------<  183<H>  4.2   |  30  |  1.48<H>    Ca    9.4      02 Feb 2021 08:02  Phos  2.9     02-02  Mg     2.2     02-02    TPro  8.1  /  Alb  3.6  /  TBili  0.5  /  DBili  x   /  AST  254<H>  /  ALT  250<H>  /  AlkPhos  213<H>  02-01      LIVER FUNCTIONS - ( 01 Feb 2021 07:14 )  Alb: 3.6 g/dL / Pro: 8.1 g/dL / ALK PHOS: 213 U/L / ALT: 250 U/L DA / AST: 254 U/L / GGT: x             CAPILLARY BLOOD GLUCOSE      POCT Blood Glucose.: 336 mg/dL (02 Feb 2021 16:38)  POCT Blood Glucose.: 343 mg/dL (02 Feb 2021 11:09)  POCT Blood Glucose.: 177 mg/dL (02 Feb 2021 08:10)  POCT Blood Glucose.: 198 mg/dL (01 Feb 2021 21:08)      Radiology:    Drug Screen:        REVIEW OF SYSTEMS:  CONSTITUTIONAL: No fever or fatigue  RESPIRATORY: No cough, wheezing, chills or hemoptysis; No shortness of breath  CARDIOVASCULAR: No chest pain, palpitations, dizziness, or leg swelling  GASTROINTESTINAL: No abdominal or epigastric pain. No nausea, vomiting; No diarrhea or constipation.   GENITOURINARY: No dysuria, frequency, hematuria, retention or incontinence  MUSCULOSKELETAL: + right knee pain  NEURO: No headaches, No numbness/tingling b/l LE, No weakness  PSYCHIATRIC: No depression, anxiety, mood swings, or difficulty sleeping    PHYSICAL EXAM:  GENERAL:  Alert & Oriented X3, NAD, Good concentration  CHEST/LUNG: Clear to auscultation bilaterally; No rales, rhonchi, wheezing, or rubs  HEART: Regular rate and rhythm; No murmurs, rubs, or gallops  ABDOMEN: Soft, Nontender, Nondistended; Bowel sounds present  EXTREMITIES:  2+ Peripheral Pulses, No cyanosis, or edema  MUSCULOSKELETAL: + decreased rom + right knee tenderness   SKIN: No rashes or lesions    Risk factors associated with adverse outcomes related to opioid treatment  [ ]  Concurrent benzodiazepine use  [ ]  History/ Active substance use or alcohol use disorder  [ ] Psychiatric co-morbidity  [ ] Sleep apnea  [ ] COPD  [ ] BMI> 35  [ ] Liver dysfunction  [ ] Renal dysfunction  [ ] CHF  [ ] Smoker  [ ]  Age > 60 years    [x ]  [place]  Reviewed and Copied to Chart. See below.    Plan of care and goal oriented pain management treatment options were discussed with patient and /or primary care giver; all questions and concerns were addressed and care was aligned with patient's wishes.    Educated patient on goal oriented pain management treatment options     02-02-21 @ 16:45
Source of information: , Chart review  Patient language: English  : n/a    CC:  right knee pain    This is a 50yMale patient who presents to the hospital for Patient is a 50y old  Male who presents with a chief complaint of joint pain (01 Feb 2021 14:43)    Pt with pseudogout of right knee.  Pt edema and tenderness of right knee. Pain is better.  Pt is able to ambulate.  On prednisone - blood sugars are elevated.     PAIN SCORE:   5/10      SCALE USED: (1-10 NRS)      PAST MEDICAL & SURGICAL HISTORY:  HTN (hypertension)    DM (diabetes mellitus)    Gout    S/P cholecystectomy    S/P appendectomy        FAMILY HISTORY:  FH: prostate cancer (Father)    FH: diabetes mellitus (Mother)    No pertinent family history          SOCIAL HISTORY:  [x ] Denies Smoking, Alcohol, or Drug Use    Allergies    No Known Drug Allergies  pollen, seasonal allergies (Sneezing)    Intolerances        MEDICATIONS:    MEDICATIONS  (STANDING):  allopurinol 100 milliGRAM(s) Oral daily  colchicine 0.6 milliGRAM(s) Oral two times a day  enoxaparin Injectable 40 milliGRAM(s) SubCutaneous daily  insulin glargine Injectable (LANTUS) 10 Unit(s) SubCutaneous at bedtime  insulin lispro (ADMELOG) corrective regimen sliding scale   SubCutaneous Before meals and at bedtime  lidocaine   Patch 3 Patch Transdermal daily  pantoprazole    Tablet 40 milliGRAM(s) Oral before breakfast  predniSONE   Tablet 40 milliGRAM(s) Oral daily  senna 2 Tablet(s) Oral at bedtime  sodium chloride 0.9% lock flush 3 milliLiter(s) IV Push every 8 hours    MEDICATIONS  (PRN):  acetaminophen   Tablet .. 1000 milliGRAM(s) Oral every 8 hours PRN Moderate Pain (4 - 6)  oxyCODONE    IR 5 milliGRAM(s) Oral every 4 hours PRN Severe Pain (7 - 10)  polyethylene glycol 3350 17 Gram(s) Oral daily PRN Constipation      Vital Signs Last 24 Hrs  T(C): 36.3 (01 Feb 2021 13:42), Max: 36.7 (31 Jan 2021 20:08)  T(F): 97.4 (01 Feb 2021 13:42), Max: 98 (31 Jan 2021 20:08)  HR: 64 (01 Feb 2021 13:42) (58 - 68)  BP: 129/85 (01 Feb 2021 13:42) (99/74 - 129/85)  BP(mean): --  RR: 18 (01 Feb 2021 13:42) (18 - 18)  SpO2: 98% (01 Feb 2021 13:42) (98% - 99%)    LABS:                          14.6   10.90 )-----------( 363      ( 01 Feb 2021 07:14 )             43.5     02-01    138  |  101  |  29<H>  ----------------------------<  100<H>  4.3   |  31  |  1.32<H>    Ca    10.0      01 Feb 2021 07:14    TPro  8.1  /  Alb  3.6  /  TBili  0.5  /  DBili  x   /  AST  254<H>  /  ALT  250<H>  /  AlkPhos  213<H>  02-01      LIVER FUNCTIONS - ( 01 Feb 2021 07:14 )  Alb: 3.6 g/dL / Pro: 8.1 g/dL / ALK PHOS: 213 U/L / ALT: 250 U/L DA / AST: 254 U/L / GGT: x             CAPILLARY BLOOD GLUCOSE      POCT Blood Glucose.: 282 mg/dL (01 Feb 2021 12:10)  POCT Blood Glucose.: 91 mg/dL (01 Feb 2021 07:33)  POCT Blood Glucose.: 183 mg/dL (31 Jan 2021 21:46)  POCT Blood Glucose.: 327 mg/dL (31 Jan 2021 16:10)      Radiology:    Drug Screen:        REVIEW OF SYSTEMS:  CONSTITUTIONAL: No fever or fatigue  RESPIRATORY: No cough, wheezing, chills or hemoptysis; No shortness of breath  CARDIOVASCULAR: No chest pain, palpitations, dizziness, or leg swelling  GASTROINTESTINAL: No abdominal or epigastric pain. No nausea, vomiting; No diarrhea or constipation.   GENITOURINARY: No dysuria, frequency, hematuria, retention or incontinence  MUSCULOSKELETAL: + right knee pain   NEURO: No headaches, No numbness/tingling b/l LE, No weakness  PSYCHIATRIC: No depression, anxiety, mood swings, or difficulty sleeping    PHYSICAL EXAM:  GENERAL:  Alert & Oriented X3, NAD, Good concentration  CHEST/LUNG: Clear to auscultation bilaterally; No rales, rhonchi, wheezing, or rubs  HEART: Regular rate and rhythm; No murmurs, rubs, or gallops  ABDOMEN: Soft, Nontender, Nondistended; Bowel sounds present  EXTREMITIES:  2+ Peripheral Pulses, No cyanosis, or edema  MUSCULOSKELETAL: + decreased rom + right knee tenderness   SKIN: No rashes or lesions    Risk factors associated with adverse outcomes related to opioid treatment  [ ]  Concurrent benzodiazepine use  [ ]  History/ Active substance use or alcohol use disorder  [ ] Psychiatric co-morbidity  [ ] Sleep apnea  [ ] COPD  [ ] BMI> 35  [ ] Liver dysfunction  [ ] Renal dysfunction  [ ] CHF  [ ] Smoker  [ ]  Age > 60 years     x]  [place]  Reviewed and Copied to Chart. See below.    Plan of care and goal oriented pain management treatment options were discussed with patient and /or primary care giver; all questions and concerns were addressed and care was aligned with patient's wishes.    Educated patient on goal oriented pain management treatment options     02-01-21 @ 15:19
Source of information: , Chart review  Patient language: English  : n/a    CC:  right knee pain    This is a 50yMale patient who presents to the hospital for Patient is a 50y old  Male who presents with a chief complaint of joint pain (29 Jan 2021 11:48)    Pt s/p right knee swelling and tenderness.  Pt underwent arthocentesis yesterday - Cell count low. + calcium pyrophosphate - pseudogout.  + right knee edema and tenderness.  Pain on light palpation.  Ct shows effusion.     PAIN SCORE:   5/10      SCALE USED: (1-10 NRS)      PAST MEDICAL & SURGICAL HISTORY:  HTN (hypertension)    DM (diabetes mellitus)    Gout    S/P cholecystectomy    S/P appendectomy        FAMILY HISTORY:  FH: prostate cancer (Father)    FH: diabetes mellitus (Mother)    No pertinent family history          SOCIAL HISTORY:  [ x] Denies Smoking, Alcohol, or Drug Use    Allergies    No Known Drug Allergies  pollen, seasonal allergies (Sneezing)    Intolerances        MEDICATIONS:    MEDICATIONS  (STANDING):  allopurinol 100 milliGRAM(s) Oral daily  atorvastatin 40 milliGRAM(s) Oral at bedtime  colchicine 0.6 milliGRAM(s) Oral two times a day  enoxaparin Injectable 40 milliGRAM(s) SubCutaneous daily  insulin lispro (ADMELOG) corrective regimen sliding scale   SubCutaneous Before meals and at bedtime  lidocaine   Patch 3 Patch Transdermal daily  pantoprazole    Tablet 40 milliGRAM(s) Oral before breakfast  predniSONE   Tablet 40 milliGRAM(s) Oral daily  sodium chloride 0.9% lock flush 3 milliLiter(s) IV Push every 8 hours  sodium chloride 0.9%. 1000 milliLiter(s) (100 mL/Hr) IV Continuous <Continuous>    MEDICATIONS  (PRN):  acetaminophen   Tablet .. 1000 milliGRAM(s) Oral every 8 hours PRN Moderate Pain (4 - 6)  oxyCODONE    IR 5 milliGRAM(s) Oral every 6 hours PRN Severe Pain (7 - 10)      Vital Signs Last 24 Hrs  T(C): 36.3 (29 Jan 2021 14:13), Max: 36.8 (29 Jan 2021 05:26)  T(F): 97.4 (29 Jan 2021 14:13), Max: 98.3 (29 Jan 2021 05:26)  HR: 65 (29 Jan 2021 14:13) (62 - 69)  BP: 130/84 (29 Jan 2021 14:13) (130/84 - 151/80)  BP(mean): 106 (29 Jan 2021 13:50) (106 - 106)  RR: 17 (29 Jan 2021 14:13) (17 - 18)  SpO2: 97% (29 Jan 2021 14:13) (97% - 100%)    LABS:                          14.0   10.56 )-----------( 254      ( 29 Jan 2021 08:39 )             42.5     01-29    135  |  102  |  17  ----------------------------<  203<H>  4.6   |  27  |  1.30    Ca    9.4      29 Jan 2021 08:39  Phos  2.2     01-28  Mg     1.7     01-28    TPro  7.9  /  Alb  3.8  /  TBili  0.7  /  DBili  x   /  AST  41<H>  /  ALT  51  /  AlkPhos  145<H>  01-29    PT/INR - ( 28 Jan 2021 03:27 )   PT: 11.4 sec;   INR: 0.96 ratio         PTT - ( 28 Jan 2021 03:27 )  PTT:32.7 sec  LIVER FUNCTIONS - ( 29 Jan 2021 08:39 )  Alb: 3.8 g/dL / Pro: 7.9 g/dL / ALK PHOS: 145 U/L / ALT: 51 U/L DA / AST: 41 U/L / GGT: x             CAPILLARY BLOOD GLUCOSE      POCT Blood Glucose.: 263 mg/dL (29 Jan 2021 11:58)  POCT Blood Glucose.: 170 mg/dL (29 Jan 2021 08:10)  POCT Blood Glucose.: 151 mg/dL (28 Jan 2021 22:50)  POCT Blood Glucose.: 101 mg/dL (28 Jan 2021 16:24)      Radiology:    Drug Screen:        REVIEW OF SYSTEMS:  CONSTITUTIONAL: No fever or fatigue  RESPIRATORY: No cough, wheezing, chills or hemoptysis; No shortness of breath  CARDIOVASCULAR: No chest pain, palpitations, dizziness, or leg swelling  GASTROINTESTINAL: No abdominal or epigastric pain. No nausea, vomiting; No diarrhea or constipation.   GENITOURINARY: No dysuria, frequency, hematuria, retention or incontinence  MUSCULOSKELETAL: + right knee pain  NEURO: No headaches, No numbness/tingling b/l LE, No weakness  PSYCHIATRIC: No depression, anxiety, mood swings, or difficulty sleeping    PHYSICAL EXAM:  GENERAL:  Alert & Oriented X3, NAD, Good concentration  CHEST/LUNG: Clear to auscultation bilaterally; No rales, rhonchi, wheezing, or rubs  HEART: Regular rate and rhythm; No murmurs, rubs, or gallops  ABDOMEN: Soft, Nontender, Nondistended; Bowel sounds present  EXTREMITIES:  2+ Peripheral Pulses, No cyanosis, or edema  MUSCULOSKELETAL: + decreased rom + right knee edema, tenderness   SKIN: No rashes or lesions    Risk factors associated with adverse outcomes related to opioid treatment  [ ]  Concurrent benzodiazepine use  [ ]  History/ Active substance use or alcohol use disorder  [ ] Psychiatric co-morbidity  [ ] Sleep apnea  [ ] COPD  [ ] BMI> 35  [ ] Liver dysfunction  [ ] Renal dysfunction  [ ] CHF  [ ] Smoker  [ ]  Age > 60 years    [x ]  [place]  Reviewed and Copied to Chart. See below.    Plan of care and goal oriented pain management treatment options were discussed with patient and /or primary care giver; all questions and concerns were addressed and care was aligned with patient's wishes.    Educated patient on goal oriented pain management treatment options     01-29-21 @ 15:10
HPI:  51 y/o male h/o gout, DM, HTN and psg hx of right knee sxg, appendectomy and cholecystectomy who presented with acute on chronic pain in multiple joints but specifically right knee.    Pt seen and examined at bedside. Pain improved. Denies F/C, N/V, SOB, C/P.    A 10-point review of systems was performed, which was negative, other than what was mentioned in the HPI/Interval Events.     OVERNIGHT EVENTS:  No new overnight events     MEDICATIONS  (STANDING):  allopurinol 100 milliGRAM(s) Oral daily  enoxaparin Injectable 40 milliGRAM(s) SubCutaneous daily  insulin glargine Injectable (LANTUS) 10 Unit(s) SubCutaneous at bedtime  insulin lispro (ADMELOG) corrective regimen sliding scale   SubCutaneous Before meals and at bedtime  insulin lispro Injectable (ADMELOG) 2 Unit(s) SubCutaneous three times a day before meals  lidocaine   Patch 3 Patch Transdermal daily  pantoprazole    Tablet 40 milliGRAM(s) Oral before breakfast  senna 2 Tablet(s) Oral at bedtime  sodium chloride 0.9% lock flush 3 milliLiter(s) IV Push every 8 hours    MEDICATIONS  (PRN):  oxyCODONE    IR 5 milliGRAM(s) Oral every 4 hours PRN Severe Pain (7 - 10)  polyethylene glycol 3350 17 Gram(s) Oral daily PRN Constipation      Vital Signs Last 24 Hrs  Vital Signs Last 24 Hrs  T(C): 36.9 (03 Feb 2021 14:20), Max: 36.9 (03 Feb 2021 14:20)  T(F): 98.4 (03 Feb 2021 14:20), Max: 98.4 (03 Feb 2021 14:20)  HR: 87 (03 Feb 2021 14:20) (58 - 87)  BP: 132/83 (03 Feb 2021 14:20) (132/79 - 146/78)  BP(mean): --  RR: 18 (03 Feb 2021 14:20) (17 - 18)  SpO2: 94% (03 Feb 2021 14:20) (94% - 98%)      PHYSICAL EXAM:  On physical exam:  Gen: NAD  Chest/CV: RRR, +2 peripheral pulses  Pulm: CTAB/L  Abd: soft, NT, ND +BS  MSK: no peripheral edema/cyanosis     LABS personally reviewed:	 	                        13.9   13.61 )-----------( 309      ( 03 Feb 2021 06:23 )             41.5     02-03    138  |  100  |  26<H>  ----------------------------<  167<H>  3.9   |  30  |  1.33<H>  02-02    137  |  101  |  33<H>  ----------------------------<  183<H>  4.2   |  30  |  1.48<H>    Ca    9.7      03 Feb 2021 06:23  Ca    9.4      02 Feb 2021 08:02  Phos  3.4     02-03  Phos  2.9     02-02  Mg     2.4     02-03  Mg     2.2     02-02        POCT Blood Glucose.: 342 mg/dL (03 Feb 2021 15:34)  POCT Blood Glucose.: 321 mg/dL (03 Feb 2021 12:34)  POCT Blood Glucose.: 460 mg/dL (03 Feb 2021 12:32)  POCT Blood Glucose.: 192 mg/dL (03 Feb 2021 07:48)  POCT Blood Glucose.: 333 mg/dL (02 Feb 2021 21:23)    BCX/UCX: .Blood Blood  01-28-21   No Growth Final  --    polymorphonuclear leukocytes seen  No organisms seen  by cytocentrifuge        RADIOLOGY:        	
Patient is a 50y old  Male who presents with a chief complaint of joint pain (28 Jan 2021 19:16)    OVERNIGHT EVENTS: verbalized minimal improvement in pain s/p diagnostic Arthrocentesis yesterday     REVIEW OF SYSTEMS:  CONSTITUTIONAL: No fever, chills  ENMT:  No difficulty hearing, no change in vision  NECK: No pain or stiffness  RESPIRATORY: No cough, SOB  CARDIOVASCULAR: No chest pain, palpitations  GASTROINTESTINAL: No abdominal pain. No nausea, vomiting, or diarrhea  GENITOURINARY: No dysuria  NEUROLOGICAL: No HA  SKIN: No itching, burning, rashes, or lesions   ENDOCRINE: +ve b/l hips shoulders and b/l knee R>L pain   MUSCULOSKELETAL: No joint pain or swelling; No muscle, back, or extremity pain      T(C): 36.8 (01-29-21 @ 05:26), Max: 36.8 (01-29-21 @ 05:26)  HR: 69 (01-29-21 @ 05:26) (58 - 69)  BP: 137/80 (01-29-21 @ 05:26) (137/80 - 151/80)  RR: 17 (01-29-21 @ 05:26) (16 - 18)  SpO2: 100% (01-29-21 @ 05:26) (99% - 100%)  Wt(kg): --Vital Signs Last 24 Hrs  T(C): 36.8 (29 Jan 2021 05:26), Max: 36.8 (29 Jan 2021 05:26)  T(F): 98.3 (29 Jan 2021 05:26), Max: 98.3 (29 Jan 2021 05:26)  HR: 69 (29 Jan 2021 05:26) (58 - 69)  BP: 137/80 (29 Jan 2021 05:26) (137/80 - 151/80)  BP(mean): --  RR: 17 (29 Jan 2021 05:26) (16 - 18)  SpO2: 100% (29 Jan 2021 05:26) (99% - 100%)    MEDICATIONS  (STANDING):  allopurinol 100 milliGRAM(s) Oral daily  atorvastatin 40 milliGRAM(s) Oral at bedtime  colchicine 0.6 milliGRAM(s) Oral two times a day  indomethacin 25 milliGRAM(s) Oral every 8 hours  insulin lispro (ADMELOG) corrective regimen sliding scale   SubCutaneous Before meals and at bedtime  lidocaine   Patch 3 Patch Transdermal daily  pantoprazole    Tablet 40 milliGRAM(s) Oral before breakfast  predniSONE   Tablet 40 milliGRAM(s) Oral daily  sodium chloride 0.9% lock flush 3 milliLiter(s) IV Push every 8 hours  sodium chloride 0.9%. 1000 milliLiter(s) (100 mL/Hr) IV Continuous <Continuous>    MEDICATIONS  (PRN):  acetaminophen   Tablet .. 1000 milliGRAM(s) Oral every 8 hours PRN Moderate Pain (4 - 6)  oxyCODONE    IR 2.5 milliGRAM(s) Oral once PRN Severe Pain (7 - 10)  oxyCODONE    IR 5 milliGRAM(s) Oral every 6 hours PRN Severe Pain (7 - 10)      PHYSICAL EXAM:  GENERAL: NAD  EYES: clear conjunctiva  ENMT: Moist mucous membranes  NECK: Supple, No JVD   CHEST/LUNG: Clear to auscultation bilaterally; No rales, rhonchi, wheezing, or rubs  HEART: S1, S2, Regular rate and rhythm  ABDOMEN: Soft, Nontender, Nondistended; Bowel sounds present  NEURO: Alert & Oriented X3  EXTREMITIES:  right knee edema +1, limited ROM hips and right knee due to pain   SKIN: No rashes or lesions    Consultant(s) Notes Reviewed:  [x ] YES  [ ] NO  Care Discussed with Consultants/Other Providers [ x] YES  [ ] NO    LABS:                        14.0   10.56 )-----------( 254      ( 29 Jan 2021 08:39 )             42.5     01-29    135  |  102  |  17  ----------------------------<  203<H>  4.6   |  27  |  1.30    Ca    9.4      29 Jan 2021 08:39  Phos  2.2     01-28  Mg     1.7     01-28    TPro  7.9  /  Alb  3.8  /  TBili  0.7  /  DBili  x   /  AST  41<H>  /  ALT  51  /  AlkPhos  145<H>  01-29    PT/INR - ( 28 Jan 2021 03:27 )   PT: 11.4 sec;   INR: 0.96 ratio         PTT - ( 28 Jan 2021 03:27 )  PTT:32.7 sec  CAPILLARY BLOOD GLUCOSE      POCT Blood Glucose.: 170 mg/dL (29 Jan 2021 08:10)  POCT Blood Glucose.: 151 mg/dL (28 Jan 2021 22:50)  POCT Blood Glucose.: 101 mg/dL (28 Jan 2021 16:24)  POCT Blood Glucose.: 162 mg/dL (28 Jan 2021 14:04)    RADIOLOGY & ADDITIONAL TESTS:    < from: CT Knee No Cont, Right (01.29.21 @ 10:07) >    EXAM:  CT 3D RECONSTRUCT DAMIAN MENDEZ                          EXAM:  CT KNEE ONLY RT                            PROCEDURE DATE:  01/29/2021          INTERPRETATION:  HISTORY: Right knee swelling. Concern for gout.    Helical CT imaging of the right knee was performed without intravenous contrast. Sagittal and coronal reformats were provided. 3-D reformats were performed on a separate workstation.    Correlation is made with prior radiographs from January 28, 2021.    FINDINGS:    There is no evidence of acute fracture or dislocation. There is mild tricompartmental osteophyte formation. There is no osseous erosion or destruction. No well-defined mineralized tophus is demonstrated.    There is a moderate to large knee joint effusion. There is no Baker's cyst. There is atherosclerotic disease.    IMPRESSION:    No evidence of osseous erosion or destruction. No well-defined mineralized tophus. Nonspecific moderate to large knee joint effusion.          < end of copied text >      Imaging Personally Reviewed:  [ ] YES  [ ] NO  
HPI:  49 y/o male h/o gout, DM, HTN and psg hx of right knee sxg, appendectomy and cholecystectomy who presented with acute on chronic pain in multiple joints but specifically right knee.  Pt seen and examined at bedside.  States his pain is 4-5 today.  Denies SOB or NVD.    OVERNIGHT EVENTS:  No new overnight events     REVIEW OF SYSTEMS:      CONSTITUTIONAL: No fever  EYES: no acute visual disturbances  NECK: No pain or stiffness  RESPIRATORY: No cough; No shortness of breath  CARDIOVASCULAR: No chest pain, no palpitations  GASTROINTESTINAL: No pain. No nausea, vomiting or diarrhea   NEUROLOGICAL: No headache or numbness, no tremors  MUSCULOSKELETAL: + joint pain, no muscle pain  GENITOURINARY: no dysuria, no frequency, no hesitancy  PSYCHIATRY: no depression, no anxiety  ALL OTHER  ROS negative        Vital Signs Last 24 Hrs  T(C): 36.6 (02 Feb 2021 05:03), Max: 36.6 (01 Feb 2021 20:11)  T(F): 97.8 (02 Feb 2021 05:03), Max: 97.8 (01 Feb 2021 20:11)  HR: 57 (02 Feb 2021 05:03) (57 - 64)  BP: 118/71 (02 Feb 2021 05:03) (115/69 - 129/85)  BP(mean): --  RR: 16 (02 Feb 2021 05:03) (16 - 18)  SpO2: 99% (02 Feb 2021 05:03) (98% - 100%)    ________________________________________________  PHYSICAL EXAM:    GENERAL: NAD  HEENT: Normocephalic; conjunctivae and sclerae clear;  NECK : supple, no JVD  CHEST/LUNG: Clear to auscultation  HEART: S1 S2  regular  ABDOMEN: Soft, Nontender, Nondistended; Bowel sounds present  EXTREMITIES: no cyanosis; no LE edema; no calf tenderness  SKIN: warm and dry  NERVOUS SYSTEM:  Alert; no new deficits    _________________________________________________  CURRENT MEDICATIONS:    MEDICATIONS  (STANDING):  allopurinol 100 milliGRAM(s) Oral daily  enoxaparin Injectable 40 milliGRAM(s) SubCutaneous daily  insulin glargine Injectable (LANTUS) 10 Unit(s) SubCutaneous at bedtime  insulin lispro (ADMELOG) corrective regimen sliding scale   SubCutaneous Before meals and at bedtime  insulin lispro Injectable (ADMELOG) 2 Unit(s) SubCutaneous three times a day before meals  lidocaine   Patch 3 Patch Transdermal daily  pantoprazole    Tablet 40 milliGRAM(s) Oral before breakfast  predniSONE   Tablet 40 milliGRAM(s) Oral daily  senna 2 Tablet(s) Oral at bedtime  sodium chloride 0.9% lock flush 3 milliLiter(s) IV Push every 8 hours    MEDICATIONS  (PRN):  oxyCODONE    IR 5 milliGRAM(s) Oral every 4 hours PRN Severe Pain (7 - 10)  polyethylene glycol 3350 17 Gram(s) Oral daily PRN Constipation      __________________________________________________  LABS:                          14.4   11.09 )-----------( 345      ( 02 Feb 2021 08:02 )             44.2     02-02    137  |  101  |  33<H>  ----------------------------<  183<H>  4.2   |  30  |  1.48<H>    Ca    9.4      02 Feb 2021 08:02  Phos  2.9     02-02  Mg     2.2     02-02    TPro  8.1  /  Alb  3.6  /  TBili  0.5  /  DBili  x   /  AST  254<H>  /  ALT  250<H>  /  AlkPhos  213<H>  02-01        CAPILLARY BLOOD GLUCOSE      POCT Blood Glucose.: 343 mg/dL (02 Feb 2021 11:09)  POCT Blood Glucose.: 177 mg/dL (02 Feb 2021 08:10)  POCT Blood Glucose.: 198 mg/dL (01 Feb 2021 21:08)  POCT Blood Glucose.: 299 mg/dL (01 Feb 2021 16:08)      __________________________________________________  RADIOLOGY & ADDITIONAL TESTS:    Imaging Personally Reviewed:  YES    < from: US Abdomen Complete (US Abdomen Complete .) (02.01.21 @ 13:49) >  IMPRESSION:    Hepatic steatosis.    Cholecystectomy.    < end of copied text >    < from: CT Knee No Cont, Right (01.29.21 @ 10:07) >  IMPRESSION:    No evidence of osseous erosion or destruction. No well-defined mineralized tophus. Nonspecific moderate to large knee joint effusion.    < end of copied text >    Consultant(s) Notes Reviewed:   YES     Plan of care was discussed with patient and /or primary care giver; all questions and concerns were addressed and care was aligned with patient's wishes.    Plan discussed with attending and consulting physicians.  
HPI:  49 y/o male h/o gout, DM, HTN and psg hx of right knee sxg, appendectomy and cholecystectomy who presented with acute on chronic pain in multiple joints but specifically right knee.  Seen and examines at bedside.  Pt still complains of pain, also states he as not had a BM since Saturday and has insomnia.  Denies SOB or NVD.    OVERNIGHT EVENTS:  No new overnight events     REVIEW OF SYSTEMS:      CONSTITUTIONAL: No fever  EYES: no acute visual disturbances  NECK: No pain or stiffness  RESPIRATORY: No cough; No shortness of breath  CARDIOVASCULAR: No chest pain, no palpitations  GASTROINTESTINAL: No pain. No nausea, vomiting or diarrhea + constipation  NEUROLOGICAL: No headache or numbness, no tremors  MUSCULOSKELETAL: + joint pain, no muscle pain  GENITOURINARY: no dysuria, no frequency, no hesitancy  PSYCHIATRY: no depression, no anxiety  ALL OTHER  ROS negative        Vital Signs Last 24 Hrs  T(C): 36.3 (01 Feb 2021 13:42), Max: 36.7 (31 Jan 2021 20:08)  T(F): 97.4 (01 Feb 2021 13:42), Max: 98 (31 Jan 2021 20:08)  HR: 64 (01 Feb 2021 13:42) (58 - 68)  BP: 129/85 (01 Feb 2021 13:42) (99/74 - 129/85)  BP(mean): --  RR: 18 (01 Feb 2021 13:42) (18 - 18)  SpO2: 98% (01 Feb 2021 13:42) (98% - 99%)    ________________________________________________  PHYSICAL EXAM:    GENERAL: NAD  HEENT: Normocephalic; conjunctivae and sclerae clear;  NECK : supple, no JVD  CHEST/LUNG: Clear to auscultation  HEART: S1 S2  regular  ABDOMEN: Soft, Nontender, Nondistended; Bowel sounds present  EXTREMITIES: no cyanosis; no LE edema; no calf tenderness  SKIN: warm and dry  NERVOUS SYSTEM:  Alert; no new deficits    _________________________________________________  CURRENT MEDICATIONS:    MEDICATIONS  (STANDING):  allopurinol 100 milliGRAM(s) Oral daily  colchicine 0.6 milliGRAM(s) Oral two times a day  enoxaparin Injectable 40 milliGRAM(s) SubCutaneous daily  insulin glargine Injectable (LANTUS) 10 Unit(s) SubCutaneous at bedtime  insulin lispro (ADMELOG) corrective regimen sliding scale   SubCutaneous Before meals and at bedtime  lidocaine   Patch 3 Patch Transdermal daily  pantoprazole    Tablet 40 milliGRAM(s) Oral before breakfast  predniSONE   Tablet 40 milliGRAM(s) Oral daily  senna 2 Tablet(s) Oral at bedtime  sodium chloride 0.9% lock flush 3 milliLiter(s) IV Push every 8 hours    MEDICATIONS  (PRN):  acetaminophen   Tablet .. 1000 milliGRAM(s) Oral every 8 hours PRN Moderate Pain (4 - 6)  oxyCODONE    IR 5 milliGRAM(s) Oral every 4 hours PRN Severe Pain (7 - 10)  polyethylene glycol 3350 17 Gram(s) Oral daily PRN Constipation      __________________________________________________  LABS:                          14.6   10.90 )-----------( 363      ( 01 Feb 2021 07:14 )             43.5     02-01    138  |  101  |  29<H>  ----------------------------<  100<H>  4.3   |  31  |  1.32<H>    Ca    10.0      01 Feb 2021 07:14    TPro  8.1  /  Alb  3.6  /  TBili  0.5  /  DBili  x   /  AST  254<H>  /  ALT  250<H>  /  AlkPhos  213<H>  02-01        CAPILLARY BLOOD GLUCOSE      POCT Blood Glucose.: 282 mg/dL (01 Feb 2021 12:10)  POCT Blood Glucose.: 91 mg/dL (01 Feb 2021 07:33)  POCT Blood Glucose.: 183 mg/dL (31 Jan 2021 21:46)  POCT Blood Glucose.: 327 mg/dL (31 Jan 2021 16:10)      __________________________________________________  RADIOLOGY & ADDITIONAL TESTS:    Imaging Personally Reviewed:  YES    < from: CT 3D Reconstruct w/o Workstation (01.29.21 @ 10:08) >  IMPRESSION:    No evidence of osseous erosion or destruction. No well-defined mineralized tophus. Nonspecific moderate to large knee joint effusion.    < end of copied text >  < from: CT Knee No Cont, Right (01.29.21 @ 10:07) >  IMPRESSION:    No evidence of osseous erosion or destruction. No well-defined mineralized tophus. Nonspecific moderate to large knee joint effusion.    < end of copied text >  < from: US Renal (01.28.21 @ 11:07) >  IMPRESSION:    Normal renal ultrasound.    < end of copied text >    Consultant(s) Notes Reviewed:   YES     Plan of care was discussed with patient and /or primary care giver; all questions and concerns were addressed and care was aligned with patient's wishes.    Plan discussed with attending and consulting physicians.

## 2021-02-03 NOTE — PROGRESS NOTE ADULT - PROBLEM SELECTOR PROBLEM 2
Gout
Effusion of right knee
Gout
Effusion of right knee
Effusion of right knee
no discharge
Effusion of right knee

## 2021-02-03 NOTE — PROGRESS NOTE ADULT - PROBLEM SELECTOR PROBLEM 3
HTN (hypertension)
Constipation
DM (diabetes mellitus)
HTN (hypertension)
Constipation
Constipation

## 2021-02-03 NOTE — PROGRESS NOTE ADULT - PROVIDER SPECIALTY LIST ADULT
Hospitalist
Internal Medicine
Pain Medicine
Internal Medicine
Internal Medicine

## 2021-02-03 NOTE — PROGRESS NOTE ADULT - ASSESSMENT
A/P: 51 y/o male h/o gout, DM, HTN and psg hx of right knee sxg, appendectomy and cholecystectomy who presented with acute on chronic pain in multiple joints but specifically right knee.    #Pseudogout- improved  -c/w colchicine, prednisone, allopurinol - prednisone to end today    #DM: FSBG high today  -HISS, lantus 10  -likely partially from steroids  -consulted Dr. Garibay endocrine today - she reviewed chart and will see him outpt. Keep on oral meds when DC home.    #RUTH  -hold ACEI for now  -may be prerenal - will bolus fluids. If no imrpovement, send urine prot/creat  -no evidence to suggest colchicine toxicity now, given no leukopenia, no neuropathy, but will closely monitor  -avoid nephrotoxins, trend BMP     #HTN  -monitor off lisinopril for now given RUTH    #Transaminitis: likely NAFLD  -will need outpt f/u with PMD    #DVT PPX  -lovenox    Rachell Lomeli MD  Division of Hospital Medicine    Plan d/w CHEN Carbone

## 2021-02-11 LAB
CULTURE RESULTS: SIGNIFICANT CHANGE UP
SPECIMEN SOURCE: SIGNIFICANT CHANGE UP

## 2021-12-28 ENCOUNTER — EMERGENCY (EMERGENCY)
Facility: HOSPITAL | Age: 51
LOS: 1 days | Discharge: HOME | End: 2021-12-28
Attending: EMERGENCY MEDICINE
Payer: MEDICAID

## 2021-12-28 VITALS
SYSTOLIC BLOOD PRESSURE: 102 MMHG | HEIGHT: 66 IN | HEART RATE: 72 BPM | WEIGHT: 210.1 LBS | TEMPERATURE: 98 F | OXYGEN SATURATION: 99 % | DIASTOLIC BLOOD PRESSURE: 66 MMHG | RESPIRATION RATE: 18 BRPM

## 2021-12-28 VITALS
SYSTOLIC BLOOD PRESSURE: 114 MMHG | TEMPERATURE: 98 F | OXYGEN SATURATION: 97 % | HEART RATE: 62 BPM | RESPIRATION RATE: 18 BRPM | DIASTOLIC BLOOD PRESSURE: 76 MMHG

## 2021-12-28 DIAGNOSIS — Z90.49 ACQUIRED ABSENCE OF OTHER SPECIFIED PARTS OF DIGESTIVE TRACT: Chronic | ICD-10-CM

## 2021-12-28 PROBLEM — I10 ESSENTIAL (PRIMARY) HYPERTENSION: Chronic | Status: ACTIVE | Noted: 2021-01-28

## 2021-12-28 PROBLEM — E11.9 TYPE 2 DIABETES MELLITUS WITHOUT COMPLICATIONS: Chronic | Status: ACTIVE | Noted: 2021-01-28

## 2021-12-28 PROBLEM — M10.9 GOUT, UNSPECIFIED: Chronic | Status: ACTIVE | Noted: 2021-01-28

## 2021-12-28 LAB
ALBUMIN SERPL ELPH-MCNC: 4 G/DL — SIGNIFICANT CHANGE UP (ref 3.5–5)
ALP SERPL-CCNC: 101 U/L — SIGNIFICANT CHANGE UP (ref 40–120)
ALT FLD-CCNC: 34 U/L DA — SIGNIFICANT CHANGE UP (ref 10–60)
ANION GAP SERPL CALC-SCNC: 6 MMOL/L — SIGNIFICANT CHANGE UP (ref 5–17)
AST SERPL-CCNC: 27 U/L — SIGNIFICANT CHANGE UP (ref 10–40)
BILIRUB SERPL-MCNC: 0.5 MG/DL — SIGNIFICANT CHANGE UP (ref 0.2–1.2)
BUN SERPL-MCNC: 28 MG/DL — HIGH (ref 7–18)
CALCIUM SERPL-MCNC: 9 MG/DL — SIGNIFICANT CHANGE UP (ref 8.4–10.5)
CHLORIDE SERPL-SCNC: 105 MMOL/L — SIGNIFICANT CHANGE UP (ref 96–108)
CO2 SERPL-SCNC: 27 MMOL/L — SIGNIFICANT CHANGE UP (ref 22–31)
CREAT SERPL-MCNC: 1.98 MG/DL — HIGH (ref 0.5–1.3)
GLUCOSE SERPL-MCNC: 115 MG/DL — HIGH (ref 70–99)
HCT VFR BLD CALC: 46.9 % — SIGNIFICANT CHANGE UP (ref 39–50)
HGB BLD-MCNC: 15.5 G/DL — SIGNIFICANT CHANGE UP (ref 13–17)
MCHC RBC-ENTMCNC: 28.5 PG — SIGNIFICANT CHANGE UP (ref 27–34)
MCHC RBC-ENTMCNC: 33 GM/DL — SIGNIFICANT CHANGE UP (ref 32–36)
MCV RBC AUTO: 86.2 FL — SIGNIFICANT CHANGE UP (ref 80–100)
NRBC # BLD: 0 /100 WBCS — SIGNIFICANT CHANGE UP (ref 0–0)
PLATELET # BLD AUTO: 202 K/UL — SIGNIFICANT CHANGE UP (ref 150–400)
POTASSIUM SERPL-MCNC: 3.9 MMOL/L — SIGNIFICANT CHANGE UP (ref 3.5–5.3)
POTASSIUM SERPL-SCNC: 3.9 MMOL/L — SIGNIFICANT CHANGE UP (ref 3.5–5.3)
PROT SERPL-MCNC: 8.3 G/DL — SIGNIFICANT CHANGE UP (ref 6–8.3)
RBC # BLD: 5.44 M/UL — SIGNIFICANT CHANGE UP (ref 4.2–5.8)
RBC # FLD: 12.8 % — SIGNIFICANT CHANGE UP (ref 10.3–14.5)
SODIUM SERPL-SCNC: 138 MMOL/L — SIGNIFICANT CHANGE UP (ref 135–145)
WBC # BLD: 10.47 K/UL — SIGNIFICANT CHANGE UP (ref 3.8–10.5)
WBC # FLD AUTO: 10.47 K/UL — SIGNIFICANT CHANGE UP (ref 3.8–10.5)

## 2021-12-28 PROCEDURE — 85027 COMPLETE CBC AUTOMATED: CPT

## 2021-12-28 PROCEDURE — 99284 EMERGENCY DEPT VISIT MOD MDM: CPT | Mod: 25

## 2021-12-28 PROCEDURE — 71275 CT ANGIOGRAPHY CHEST: CPT | Mod: 26,ME

## 2021-12-28 PROCEDURE — 36415 COLL VENOUS BLD VENIPUNCTURE: CPT

## 2021-12-28 PROCEDURE — G1004: CPT

## 2021-12-28 PROCEDURE — 80053 COMPREHEN METABOLIC PANEL: CPT

## 2021-12-28 PROCEDURE — 99285 EMERGENCY DEPT VISIT HI MDM: CPT

## 2021-12-28 PROCEDURE — 71275 CT ANGIOGRAPHY CHEST: CPT | Mod: ME

## 2021-12-28 RX ORDER — IBUPROFEN 200 MG
600 TABLET ORAL ONCE
Refills: 0 | Status: COMPLETED | OUTPATIENT
Start: 2021-12-28 | End: 2021-12-28

## 2021-12-28 RX ORDER — ACETAMINOPHEN 500 MG
650 TABLET ORAL ONCE
Refills: 0 | Status: COMPLETED | OUTPATIENT
Start: 2021-12-28 | End: 2021-12-28

## 2021-12-28 RX ORDER — SODIUM CHLORIDE 9 MG/ML
1000 INJECTION INTRAMUSCULAR; INTRAVENOUS; SUBCUTANEOUS ONCE
Refills: 0 | Status: COMPLETED | OUTPATIENT
Start: 2021-12-28 | End: 2021-12-28

## 2021-12-28 RX ADMIN — Medication 600 MILLIGRAM(S): at 16:34

## 2021-12-28 RX ADMIN — Medication 650 MILLIGRAM(S): at 16:34

## 2021-12-28 RX ADMIN — SODIUM CHLORIDE 1000 MILLILITER(S): 9 INJECTION INTRAMUSCULAR; INTRAVENOUS; SUBCUTANEOUS at 16:19

## 2021-12-28 NOTE — ED ADULT NURSE NOTE - NS_ED_NURSE_TEACHING_TOPIC_ED_A_ED
Patient is requesting to speak with PCP or a RN regarding continuing Ankle Pain and X-ray results from 8/28/21.    Patient Name: Susan Ruth  Caller Name: Patient  Callback Number:   Mobile [phone]     Best Availability: ASAP  Can A Detailed Message Be left?:  YES  Did you confirm the message with the caller?:  yes    Thank you,  Ok Adamson   Respiratory

## 2021-12-28 NOTE — ED PROVIDER NOTE - CLINICAL SUMMARY MEDICAL DECISION MAKING FREE TEXT BOX
COVID-19 in a vaccinated diabetic with vital signs currently within normal limits. Will measure oxygen saturation on ambulation in the ED with pulse oximeter to assess for safe disposition.

## 2021-12-28 NOTE — ED ADULT TRIAGE NOTE - HEART RATE (BEATS/MIN)
72
Alert-The patient is alert, awake and responds to voice. The patient is oriented to time, place, and person. The triage nurse is able to obtain subjective information.

## 2021-12-28 NOTE — ED PROVIDER NOTE - OBJECTIVE STATEMENT
51 year old male with PMHx of diabetes mellitus who has been vaccinated against COVID-19 presents to the ED with complaints of feeling ill over the past three days. Patient endorses that he tested positive for COVID-19 today, and is complaining of shortness of breath and the sensation that his chest "is going to explode". Patient states that there are no alleviating rule out aggravating factors to these symptoms. Patient otherwise denies all other acute symptoms. NKDA. 51 year old male with PMHx of diabetes mellitus who has been vaccinated against COVID-19 presents to the ED with complaints of feeling ill over the past three days. Patient endorses that he tested positive for COVID-19 today, and is complaining of shortness of breath and the sensation that his chest "is going to explode". Patient states that there are no alleviating or aggravating factors to these symptoms. Patient otherwise denies all other acute symptoms. NKDA.

## 2021-12-28 NOTE — ED PROVIDER NOTE - PATIENT PORTAL LINK FT
You can access the FollowMyHealth Patient Portal offered by [facility] by registering at the following website: [email or web address]. By joining [employer]’s FollowMyHealth portal, you will also be able to view your health information using other applications (apps) compatible with our system.

## 2021-12-28 NOTE — ED PROVIDER NOTE - PROGRESS NOTE DETAILS
PCA walked patient around the room several times, after which patient's pulse oximeter dropped to 96% on room air with exertion. Patient now notes hemoptysis, so will rule out blood clots with CT scan. Will reevaluate for disposition. CTPE without disease, cr elevated, labs printed and given to patient and explained in detail  reeval feels better after liter of fluid  d/w pt fu and return to ED needs, agreable ot dc PCA walked patient around the room several times, after which patient's pulse oximeter dropped to 96% on room air with exertion and immediately perks up. Patient now notes hemoptysis, so will rule out blood clots with CT scan. Will reevaluate for disposition.

## 2021-12-28 NOTE — ED PROVIDER NOTE - CARE PLAN
1 Principal Discharge DX:	2019 novel coronavirus disease (COVID-19)  Secondary Diagnosis:	RUTH (acute kidney injury)

## 2021-12-28 NOTE — ED PROVIDER NOTE - NSICDXFAMILYHX_GEN_ALL_CORE_FT
FAMILY HISTORY:  No pertinent family history    Father  Still living? No  FH: prostate cancer, Age at diagnosis: Age Unknown    Mother  Still living? Unknown  FH: diabetes mellitus, Age at diagnosis: Age Unknown

## 2023-06-12 NOTE — PHYSICAL THERAPY INITIAL EVALUATION ADULT - ASR EQUIP NEEDS DISCH PT EVAL
[FreeTextEntry1] : Since last visit has been taking celebrex with some improvement in foot and ankle pain.  Still pain in Achilles and under foot with a lot of walking/activity.  Some pain getting out of bed in morning.  Is still wearing mostly Uggs.  No limping.  No joint swelling.  No other new joint pain or swelling.  No lower back or hip pain.  No jaw pain or trouble chewing.\par \par Saw GI Dr. Alejandro and reportedly had stool tests which were normal and EGD which was unremarkable - family to have reports faxed.  Thought to have IBS.  Has been treated with prevacid and miralax, to start probiotic.  Still has pain most days.  Stooling 3-4 times a day, formed not loose, no blood in stool.  No emesis.  Weight stable.  \par \par Has had continued intermittent rash/scaling on shins and scalp - comes and goes, no lesions currently.\par \par No fevers or recent illness.  No eye pain/redness/change in vision.  No sores in the mouth or nose.  No difficulty swallowing.  No chest pain or shortness of breath.   No weakness.  No headaches or focal neurological deficits.  No urinary changes.  No other new symptoms.\par \par \par 
[FreeTextEntry1] : Since last visit has been taking celebrex with some improvement in foot and ankle pain.  Still pain in Achilles and under foot with a lot of walking/activity.  Some pain getting out of bed in morning.  Is still wearing mostly Uggs.  No limping.  No joint swelling.  No other new joint pain or swelling.  No lower back or hip pain.  No jaw pain or trouble chewing.\par \par Saw GI Dr. Alejandro and reportedly had stool tests which were normal and EGD which was unremarkable - family to have reports faxed.  Thought to have IBS.  Has been treated with prevacid and miralax, to start probiotic.  Still has pain most days.  Stooling 3-4 times a day, formed not loose, no blood in stool.  No emesis.  Weight stable.  \par \par Has had continued intermittent rash/scaling on shins and scalp - comes and goes, no lesions currently.\par \par No fevers or recent illness.  No eye pain/redness/change in vision.  No sores in the mouth or nose.  No difficulty swallowing.  No chest pain or shortness of breath.   No weakness.  No headaches or focal neurological deficits.  No urinary changes.  No other new symptoms.\par \par \par 
rolling walker (5 inch wheels)

## 2023-06-23 NOTE — CONSULT NOTE ADULT - CONSULT REQUESTED BY NAME
Medicine Mohs Rapid Report Verbiage: The area of clinically evident tumor was marked with skin marking ink and appropriately hatched.  The initial incision was made following the Mohs approach through the skin.  The specimen was taken to the lab, divided into the necessary number of pieces, chromacoded and processed according to the Mohs protocol.  This was repeated in successive stages until a tumor free defect was achieved.
